# Patient Record
Sex: FEMALE | Race: WHITE | NOT HISPANIC OR LATINO | Employment: UNEMPLOYED | URBAN - NONMETROPOLITAN AREA
[De-identification: names, ages, dates, MRNs, and addresses within clinical notes are randomized per-mention and may not be internally consistent; named-entity substitution may affect disease eponyms.]

---

## 2017-09-11 ENCOUNTER — HOSPITAL ENCOUNTER (EMERGENCY)
Facility: HOSPITAL | Age: 27
Discharge: HOME | End: 2017-09-11
Attending: EMERGENCY MEDICINE | Admitting: EMERGENCY MEDICINE

## 2017-09-11 ENCOUNTER — APPOINTMENT (OUTPATIENT)
Dept: CT IMAGING | Facility: HOSPITAL | Age: 27
End: 2017-09-11

## 2017-09-11 VITALS
OXYGEN SATURATION: 100 % | RESPIRATION RATE: 18 BRPM | SYSTOLIC BLOOD PRESSURE: 122 MMHG | DIASTOLIC BLOOD PRESSURE: 68 MMHG | TEMPERATURE: 98.2 F | HEIGHT: 66 IN | WEIGHT: 148 LBS | HEART RATE: 78 BPM | BODY MASS INDEX: 23.78 KG/M2

## 2017-09-11 DIAGNOSIS — M46.1 SACROILIITIS (HCC): ICD-10-CM

## 2017-09-11 DIAGNOSIS — S16.1XXA CERVICAL STRAIN, ACUTE, INITIAL ENCOUNTER: Primary | ICD-10-CM
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
## 2017-09-11 LAB
6-ACETYL MORPHINE: NEGATIVE
ALBUMIN SERPL-MCNC: 4.3 G/DL (ref 3.5–5)
ALBUMIN/GLOB SERPL: 1.5 G/DL (ref 1.5–2.5)
ALP SERPL-CCNC: 81 U/L (ref 35–104)
ALT SERPL W P-5'-P-CCNC: 17 U/L (ref 10–36)
AMPHET+METHAMPHET UR QL: NEGATIVE
ANION GAP SERPL CALCULATED.3IONS-SCNC: 6.9 MMOL/L (ref 3.6–11.2)
ANISOCYTOSIS BLD QL: NORMAL
APTT PPP: 23.9 SECONDS (ref 23.8–36.1)
AST SERPL-CCNC: 29 U/L (ref 10–30)
B-HCG UR QL: NEGATIVE
BARBITURATES UR QL SCN: POSITIVE
BASOPHILS # BLD AUTO: 0.05 10*3/MM3 (ref 0–0.3)
BASOPHILS NFR BLD AUTO: 0.4 % (ref 0–2)
BENZODIAZ UR QL SCN: NEGATIVE
BILIRUB SERPL-MCNC: 0.3 MG/DL (ref 0.2–1.8)
BILIRUB UR QL STRIP: NEGATIVE
BUN BLD-MCNC: 9 MG/DL (ref 7–21)
BUN/CREAT SERPL: 15.8 (ref 7–25)
BUPRENORPHINE SERPL-MCNC: POSITIVE NG/ML
CALCIUM SPEC-SCNC: 9.3 MG/DL (ref 7.7–10)
CANNABINOIDS SERPL QL: POSITIVE
CHLORIDE SERPL-SCNC: 107 MMOL/L (ref 99–112)
CLARITY UR: CLEAR
CO2 SERPL-SCNC: 25.1 MMOL/L (ref 24.3–31.9)
COCAINE UR QL: NEGATIVE
COLOR UR: YELLOW
CREAT BLD-MCNC: 0.57 MG/DL (ref 0.43–1.29)
DEPRECATED RDW RBC AUTO: 50 FL (ref 37–54)
EOSINOPHIL # BLD AUTO: 0.13 10*3/MM3 (ref 0–0.7)
EOSINOPHIL NFR BLD AUTO: 1 % (ref 0–5)
ERYTHROCYTE [DISTWIDTH] IN BLOOD BY AUTOMATED COUNT: 19.9 % (ref 11.5–14.5)
GFR SERPL CREATININE-BSD FRML MDRD: 128 ML/MIN/1.73
GLOBULIN UR ELPH-MCNC: 2.9 GM/DL
GLUCOSE BLD-MCNC: 85 MG/DL (ref 70–110)
GLUCOSE UR STRIP-MCNC: NEGATIVE MG/DL
HCT VFR BLD AUTO: 41 % (ref 37–47)
HGB BLD-MCNC: 12.4 G/DL (ref 12–16)
HGB UR QL STRIP.AUTO: NEGATIVE
HYPOCHROMIA BLD QL: NORMAL
IMM GRANULOCYTES # BLD: 0.04 10*3/MM3 (ref 0–0.03)
IMM GRANULOCYTES NFR BLD: 0.3 % (ref 0–0.5)
INR PPP: 0.95 (ref 0.9–1.1)
KETONES UR QL STRIP: NEGATIVE
LEUKOCYTE ESTERASE UR QL STRIP.AUTO: NEGATIVE
LIPASE SERPL-CCNC: 35 U/L (ref 13–60)
LYMPHOCYTES # BLD AUTO: 2.57 10*3/MM3 (ref 1–3)
LYMPHOCYTES NFR BLD AUTO: 19.6 % (ref 21–51)
MCH RBC QN AUTO: 21.6 PG (ref 27–33)
MCHC RBC AUTO-ENTMCNC: 30.2 G/DL (ref 33–37)
MCV RBC AUTO: 71.6 FL (ref 80–94)
METHADONE UR QL SCN: NEGATIVE
MICROCYTES BLD QL: NORMAL
MONOCYTES # BLD AUTO: 0.92 10*3/MM3 (ref 0.1–0.9)
MONOCYTES NFR BLD AUTO: 7 % (ref 0–10)
NEUTROPHILS # BLD AUTO: 9.43 10*3/MM3 (ref 1.4–6.5)
NEUTROPHILS NFR BLD AUTO: 71.7 % (ref 30–70)
NITRITE UR QL STRIP: NEGATIVE
OPIATES UR QL: NEGATIVE
OSMOLALITY SERPL CALC.SUM OF ELEC: 275.5 MOSM/KG (ref 273–305)
OXYCODONE UR QL SCN: NEGATIVE
PCP UR QL SCN: NEGATIVE
PH UR STRIP.AUTO: 7 [PH] (ref 5–8)
PLAT MORPH BLD: NORMAL
PLATELET # BLD AUTO: 264 10*3/MM3 (ref 130–400)
PMV BLD AUTO: 10.9 FL (ref 6–10)
POTASSIUM BLD-SCNC: 4 MMOL/L (ref 3.5–5.3)
PROT SERPL-MCNC: 7.2 G/DL (ref 6–8)
PROT UR QL STRIP: NEGATIVE
PROTHROMBIN TIME: 12.8 SECONDS (ref 11–15.4)
RBC # BLD AUTO: 5.73 10*6/MM3 (ref 4.2–5.4)
SODIUM BLD-SCNC: 139 MMOL/L (ref 135–153)
SP GR UR STRIP: 1.01 (ref 1–1.03)
UROBILINOGEN UR QL STRIP: NORMAL
WBC NRBC COR # BLD: 13.14 10*3/MM3 (ref 4.5–12.5)

## 2017-09-11 PROCEDURE — 71250 CT THORAX DX C-: CPT | Performed by: RADIOLOGY

## 2017-09-11 PROCEDURE — 81025 URINE PREGNANCY TEST: CPT | Performed by: EMERGENCY MEDICINE

## 2017-09-11 PROCEDURE — 80307 DRUG TEST PRSMV CHEM ANLYZR: CPT | Performed by: EMERGENCY MEDICINE

## 2017-09-11 PROCEDURE — 72131 CT LUMBAR SPINE W/O DYE: CPT | Performed by: RADIOLOGY

## 2017-09-11 PROCEDURE — 25010000002 ORPHENADRINE CITRATE PER 60 MG: Performed by: EMERGENCY MEDICINE

## 2017-09-11 PROCEDURE — 74176 CT ABD & PELVIS W/O CONTRAST: CPT

## 2017-09-11 PROCEDURE — P9612 CATHETERIZE FOR URINE SPEC: HCPCS

## 2017-09-11 PROCEDURE — 25010000002 BUTORPHANOL PER 1 MG: Performed by: EMERGENCY MEDICINE

## 2017-09-11 PROCEDURE — 83690 ASSAY OF LIPASE: CPT | Performed by: EMERGENCY MEDICINE

## 2017-09-11 PROCEDURE — 85025 COMPLETE CBC W/AUTO DIFF WBC: CPT | Performed by: EMERGENCY MEDICINE

## 2017-09-11 PROCEDURE — 70450 CT HEAD/BRAIN W/O DYE: CPT | Performed by: RADIOLOGY

## 2017-09-11 PROCEDURE — 71250 CT THORAX DX C-: CPT

## 2017-09-11 PROCEDURE — 85610 PROTHROMBIN TIME: CPT | Performed by: EMERGENCY MEDICINE

## 2017-09-11 PROCEDURE — 74176 CT ABD & PELVIS W/O CONTRAST: CPT | Performed by: RADIOLOGY

## 2017-09-11 PROCEDURE — 81003 URINALYSIS AUTO W/O SCOPE: CPT | Performed by: EMERGENCY MEDICINE

## 2017-09-11 PROCEDURE — 36415 COLL VENOUS BLD VENIPUNCTURE: CPT

## 2017-09-11 PROCEDURE — 99284 EMERGENCY DEPT VISIT MOD MDM: CPT

## 2017-09-11 PROCEDURE — 72125 CT NECK SPINE W/O DYE: CPT | Performed by: RADIOLOGY

## 2017-09-11 PROCEDURE — 72131 CT LUMBAR SPINE W/O DYE: CPT

## 2017-09-11 PROCEDURE — 72125 CT NECK SPINE W/O DYE: CPT

## 2017-09-11 PROCEDURE — 96374 THER/PROPH/DIAG INJ IV PUSH: CPT

## 2017-09-11 PROCEDURE — 72128 CT CHEST SPINE W/O DYE: CPT

## 2017-09-11 PROCEDURE — 85007 BL SMEAR W/DIFF WBC COUNT: CPT | Performed by: EMERGENCY MEDICINE

## 2017-09-11 PROCEDURE — 80053 COMPREHEN METABOLIC PANEL: CPT | Performed by: EMERGENCY MEDICINE

## 2017-09-11 PROCEDURE — 70450 CT HEAD/BRAIN W/O DYE: CPT

## 2017-09-11 PROCEDURE — 85730 THROMBOPLASTIN TIME PARTIAL: CPT | Performed by: EMERGENCY MEDICINE

## 2017-09-11 PROCEDURE — 96375 TX/PRO/DX INJ NEW DRUG ADDON: CPT

## 2017-09-11 PROCEDURE — 72128 CT CHEST SPINE W/O DYE: CPT | Performed by: RADIOLOGY

## 2017-09-11 RX ORDER — BUPROPION HYDROCHLORIDE 100 MG/1
100 TABLET ORAL 2 TIMES DAILY
Status: ON HOLD | COMMUNITY
End: 2021-09-07

## 2017-09-11 RX ORDER — METAXALONE 800 MG/1
800 TABLET ORAL 3 TIMES DAILY PRN
Qty: 15 TABLET | Refills: 0 | Status: SHIPPED | OUTPATIENT
Start: 2017-09-11 | End: 2017-11-08

## 2017-09-11 RX ORDER — QUETIAPINE FUMARATE 25 MG/1
25 TABLET, FILM COATED ORAL NIGHTLY
Status: ON HOLD | COMMUNITY
End: 2021-09-07

## 2017-09-11 RX ORDER — HYDROXYZINE PAMOATE 25 MG/1
25 CAPSULE ORAL 3 TIMES DAILY PRN
Status: ON HOLD | COMMUNITY
End: 2021-09-07

## 2017-09-11 RX ORDER — SODIUM CHLORIDE 0.9 % (FLUSH) 0.9 %
10 SYRINGE (ML) INJECTION AS NEEDED
Status: DISCONTINUED | OUTPATIENT
Start: 2017-09-11 | End: 2017-09-12 | Stop reason: HOSPADM

## 2017-09-11 RX ORDER — NICOTINE 21 MG/24HR
1 PATCH, TRANSDERMAL 24 HOURS TRANSDERMAL ONCE
Status: DISCONTINUED | OUTPATIENT
Start: 2017-09-11 | End: 2017-09-12 | Stop reason: HOSPADM

## 2017-09-11 RX ORDER — NABUMETONE 750 MG/1
750 TABLET, FILM COATED ORAL 2 TIMES DAILY PRN
Qty: 20 TABLET | Refills: 0 | Status: SHIPPED | OUTPATIENT
Start: 2017-09-11 | End: 2017-11-08

## 2017-09-11 RX ORDER — NICOTINE 21 MG/24HR
PATCH, TRANSDERMAL 24 HOURS TRANSDERMAL
Status: DISCONTINUED
Start: 2017-09-11 | End: 2017-09-12 | Stop reason: HOSPADM

## 2017-09-11 RX ORDER — GABAPENTIN 600 MG/1
600 TABLET ORAL 2 TIMES DAILY
Status: ON HOLD | COMMUNITY
End: 2021-09-07

## 2017-09-11 RX ORDER — BUPRENORPHINE HYDROCHLORIDE AND NALOXONE HYDROCHLORIDE DIHYDRATE 8; 2 MG/1; MG/1
1 TABLET SUBLINGUAL DAILY
Status: ON HOLD | COMMUNITY
End: 2021-09-07

## 2017-09-11 RX ORDER — ORPHENADRINE CITRATE 30 MG/ML
60 INJECTION INTRAMUSCULAR; INTRAVENOUS ONCE
Status: COMPLETED | OUTPATIENT
Start: 2017-09-11 | End: 2017-09-11

## 2017-09-11 RX ADMIN — NICOTINE 1 PATCH: 21 PATCH TRANSDERMAL at 20:25

## 2017-09-11 RX ADMIN — BUTORPHANOL TARTRATE 1 MG: 2 INJECTION, SOLUTION INTRAMUSCULAR; INTRAVENOUS at 20:46

## 2017-09-11 RX ADMIN — ORPHENADRINE CITRATE 60 MG: 30 INJECTION INTRAMUSCULAR; INTRAVENOUS at 20:47

## 2017-09-11 RX ADMIN — Medication 1 PATCH: at 20:25

## 2017-09-12 NOTE — ED NOTES
I went back to the patient's room to get her Protime-INR and aPTT but she is still with radiology at this time.     Gene Sanchez  09/11/17 0822

## 2017-09-12 NOTE — ED NOTES
Consent for IV contrast signed by patient at this time; patient resting on stretcher with hard C Collar in place; will continue to monitor patient for any changes.     Amparo Tariq RN  09/11/17 2058

## 2017-09-12 NOTE — ED NOTES
Patient to Ct at this time via stretcher with c collar in place.      Amparo Tariq RN  09/11/17 1023
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
## 2017-09-12 NOTE — ED NOTES
Assisted Dr. Sandoval to log roll patient off of backboard. C-collar remains in place. Patient instructed to remain lying flat in her bed. Call light within reach.     Negra Mendoza RN  09/11/17 2011       Negra Mendoza RN  09/11/17 2012

## 2017-09-12 NOTE — ED NOTES
Call received from CT stating that IV in Left Upper Arm has infiltrated.     Amparo Tariq, LENCHO  09/11/17 2449

## 2017-09-12 NOTE — ED NOTES
Patient back from Ct at this time; Dr PARRY aware of inability to obtain iv access; reports to do ct's without contrast; xray made aware at this time.     Amparo Tariq RN  09/11/17 2140       Amparo Tariq RN  09/11/17 0182

## 2017-10-22 ENCOUNTER — HOSPITAL ENCOUNTER (EMERGENCY)
Facility: HOSPITAL | Age: 27
Discharge: HOME | End: 2017-10-22
Attending: EMERGENCY MEDICINE | Admitting: EMERGENCY MEDICINE

## 2017-10-22 VITALS
TEMPERATURE: 98.2 F | HEIGHT: 66 IN | SYSTOLIC BLOOD PRESSURE: 128 MMHG | WEIGHT: 140 LBS | HEART RATE: 93 BPM | OXYGEN SATURATION: 98 % | BODY MASS INDEX: 22.5 KG/M2 | RESPIRATION RATE: 17 BRPM | DIASTOLIC BLOOD PRESSURE: 72 MMHG

## 2017-10-22 DIAGNOSIS — K04.7 DENTAL ABSCESS: Primary | ICD-10-CM

## 2017-10-22 LAB
ALBUMIN SERPL-MCNC: 4.2 G/DL (ref 3.5–5)
ALBUMIN/GLOB SERPL: 1.2 G/DL (ref 1.5–2.5)
ALP SERPL-CCNC: 86 U/L (ref 35–104)
ALT SERPL W P-5'-P-CCNC: 19 U/L (ref 10–36)
ANION GAP SERPL CALCULATED.3IONS-SCNC: 2.5 MMOL/L (ref 3.6–11.2)
ANISOCYTOSIS BLD QL: NORMAL
AST SERPL-CCNC: 27 U/L (ref 10–30)
BASOPHILS # BLD AUTO: 0.04 10*3/MM3 (ref 0–0.3)
BASOPHILS NFR BLD AUTO: 0.5 % (ref 0–2)
BILIRUB SERPL-MCNC: 0.5 MG/DL (ref 0.2–1.8)
BUN BLD-MCNC: 7 MG/DL (ref 7–21)
BUN/CREAT SERPL: 14.3 (ref 7–25)
CALCIUM SPEC-SCNC: 9.2 MG/DL (ref 7.7–10)
CHLORIDE SERPL-SCNC: 105 MMOL/L (ref 99–112)
CO2 SERPL-SCNC: 31.5 MMOL/L (ref 24.3–31.9)
CREAT BLD-MCNC: 0.49 MG/DL (ref 0.43–1.29)
CRP SERPL-MCNC: 4.23 MG/DL (ref 0–0.99)
D-LACTATE SERPL-SCNC: 1 MMOL/L (ref 0.5–2)
DEPRECATED RDW RBC AUTO: 49.7 FL (ref 37–54)
EOSINOPHIL # BLD AUTO: 0.08 10*3/MM3 (ref 0–0.7)
EOSINOPHIL NFR BLD AUTO: 1.1 % (ref 0–5)
ERYTHROCYTE [DISTWIDTH] IN BLOOD BY AUTOMATED COUNT: 19.2 % (ref 11.5–14.5)
ERYTHROCYTE [SEDIMENTATION RATE] IN BLOOD: 21 MM/HR (ref 0–20)
GFR SERPL CREATININE-BSD FRML MDRD: >150 ML/MIN/1.73
GLOBULIN UR ELPH-MCNC: 3.4 GM/DL
GLUCOSE BLD-MCNC: 92 MG/DL (ref 70–110)
HCT VFR BLD AUTO: 37.8 % (ref 37–47)
HGB BLD-MCNC: 11.5 G/DL (ref 12–16)
HYPOCHROMIA BLD QL: NORMAL
IMM GRANULOCYTES # BLD: 0.02 10*3/MM3 (ref 0–0.03)
IMM GRANULOCYTES NFR BLD: 0.3 % (ref 0–0.5)
LARGE PLATELETS: NORMAL
LYMPHOCYTES # BLD AUTO: 2.16 10*3/MM3 (ref 1–3)
LYMPHOCYTES NFR BLD AUTO: 28.8 % (ref 21–51)
MCH RBC QN AUTO: 22.8 PG (ref 27–33)
MCHC RBC AUTO-ENTMCNC: 30.4 G/DL (ref 33–37)
MCV RBC AUTO: 74.9 FL (ref 80–94)
MICROCYTES BLD QL: NORMAL
MONOCYTES # BLD AUTO: 0.59 10*3/MM3 (ref 0.1–0.9)
MONOCYTES NFR BLD AUTO: 7.9 % (ref 0–10)
NEUTROPHILS # BLD AUTO: 4.62 10*3/MM3 (ref 1.4–6.5)
NEUTROPHILS NFR BLD AUTO: 61.4 % (ref 30–70)
OSMOLALITY SERPL CALC.SUM OF ELEC: 275.2 MOSM/KG (ref 273–305)
PLATELET # BLD AUTO: 326 10*3/MM3 (ref 130–400)
PMV BLD AUTO: 10.3 FL (ref 6–10)
POTASSIUM BLD-SCNC: 3.8 MMOL/L (ref 3.5–5.3)
PROT SERPL-MCNC: 7.6 G/DL (ref 6–8)
RBC # BLD AUTO: 5.05 10*6/MM3 (ref 4.2–5.4)
SODIUM BLD-SCNC: 139 MMOL/L (ref 135–153)
WBC NRBC COR # BLD: 7.51 10*3/MM3 (ref 4.5–12.5)

## 2017-10-22 PROCEDURE — 99283 EMERGENCY DEPT VISIT LOW MDM: CPT

## 2017-10-22 PROCEDURE — 83605 ASSAY OF LACTIC ACID: CPT | Performed by: NURSE PRACTITIONER

## 2017-10-22 PROCEDURE — 86140 C-REACTIVE PROTEIN: CPT | Performed by: NURSE PRACTITIONER

## 2017-10-22 PROCEDURE — 85007 BL SMEAR W/DIFF WBC COUNT: CPT | Performed by: NURSE PRACTITIONER

## 2017-10-22 PROCEDURE — 80053 COMPREHEN METABOLIC PANEL: CPT | Performed by: NURSE PRACTITIONER

## 2017-10-22 PROCEDURE — 85652 RBC SED RATE AUTOMATED: CPT | Performed by: NURSE PRACTITIONER

## 2017-10-22 PROCEDURE — 85025 COMPLETE CBC W/AUTO DIFF WBC: CPT | Performed by: NURSE PRACTITIONER

## 2017-10-22 PROCEDURE — 87040 BLOOD CULTURE FOR BACTERIA: CPT | Performed by: NURSE PRACTITIONER

## 2017-10-22 PROCEDURE — 96365 THER/PROPH/DIAG IV INF INIT: CPT

## 2017-10-22 RX ORDER — SODIUM CHLORIDE 0.9 % (FLUSH) 0.9 %
10 SYRINGE (ML) INJECTION AS NEEDED
Status: DISCONTINUED | OUTPATIENT
Start: 2017-10-22 | End: 2017-10-22 | Stop reason: HOSPADM

## 2017-10-22 RX ORDER — CLINDAMYCIN PHOSPHATE 600 MG/50ML
600 INJECTION INTRAVENOUS ONCE
Status: COMPLETED | OUTPATIENT
Start: 2017-10-22 | End: 2017-10-22

## 2017-10-22 RX ORDER — CLINDAMYCIN HYDROCHLORIDE 300 MG/1
300 CAPSULE ORAL EVERY 6 HOURS
Qty: 40 CAPSULE | Refills: 0 | Status: SHIPPED | OUTPATIENT
Start: 2017-10-22 | End: 2017-11-01

## 2017-10-22 RX ADMIN — CLINDAMYCIN PHOSPHATE 600 MG: 12 INJECTION, SOLUTION INTRAVENOUS at 17:21

## 2017-10-22 NOTE — ED PROVIDER NOTES
Subjective   Patient is a 27 y.o. female presenting with tooth pain.   History provided by:  Patient  Dental Pain   Location:  Upper and lower  Quality:  Aching, shooting and throbbing  Severity:  Moderate  Onset quality:  Gradual  Timing:  Constant  Chronicity:  New  Context: abscess    Relieved by:  None tried  Worsened by:  Nothing  Ineffective treatments:  None tried  Associated symptoms: facial pain and facial swelling    Associated symptoms: no fever        Review of Systems   Constitutional: Negative.  Negative for fever.   HENT: Positive for facial swelling.    Respiratory: Negative.    Cardiovascular: Negative.  Negative for chest pain.   Gastrointestinal: Negative.  Negative for abdominal pain.   Endocrine: Negative.    Genitourinary: Negative.  Negative for dysuria.   Skin: Negative.    Neurological: Negative.    Psychiatric/Behavioral: Negative.    All other systems reviewed and are negative.      History reviewed. No pertinent past medical history.    Allergies   Allergen Reactions   • Bactrim [Sulfamethoxazole-Trimethoprim]    • Erythromycin        Past Surgical History:   Procedure Laterality Date   •  SECTION     • CHOLECYSTECTOMY     • TONSILLECTOMY         History reviewed. No pertinent family history.    Social History     Social History   • Marital status: Single     Spouse name: N/A   • Number of children: N/A   • Years of education: N/A     Social History Main Topics   • Smoking status: Current Every Day Smoker     Packs/day: 1.00   • Smokeless tobacco: None   • Alcohol use Yes      Comment: occasional   • Drug use: No   • Sexual activity: Defer     Other Topics Concern   • None     Social History Narrative   • None           Objective   Physical Exam   Constitutional: She is oriented to person, place, and time. She appears well-developed and well-nourished. No distress.   HENT:   Head: Normocephalic and atraumatic.   Right Ear: External ear normal.   Left Ear: External ear normal.    Nose: Nose normal.   Mouth/Throat: Dental abscesses present.   Eyes: Conjunctivae and EOM are normal. Pupils are equal, round, and reactive to light.   Neck: Normal range of motion. Neck supple. No JVD present. No tracheal deviation present.   Cardiovascular: Normal rate, regular rhythm and normal heart sounds.    No murmur heard.  Pulmonary/Chest: Effort normal and breath sounds normal. No respiratory distress. She has no wheezes.   Abdominal: Soft. Bowel sounds are normal. There is no tenderness.   Musculoskeletal: Normal range of motion. She exhibits no edema or deformity.   Neurological: She is alert and oriented to person, place, and time. No cranial nerve deficit.   Skin: Skin is warm and dry. No rash noted. She is not diaphoretic. No erythema. No pallor.   Psychiatric: She has a normal mood and affect. Her behavior is normal. Thought content normal.   Nursing note and vitals reviewed.      Procedures         ED Course  ED Course                  MDM  Number of Diagnoses or Management Options  Dental abscess: new and does not require workup     Amount and/or Complexity of Data Reviewed  Clinical lab tests: reviewed    Risk of Complications, Morbidity, and/or Mortality  Presenting problems: low  Diagnostic procedures: minimal  Management options: minimal    Patient Progress  Patient progress: stable      Final diagnoses:   Dental abscess            Randa Ly, APRN  10/22/17 2128

## 2017-10-22 NOTE — ED NOTES
She was a difficult stick and lab was called to get blood culture.      Chapis Carbajal  10/22/17 6927

## 2017-10-27 LAB
BACTERIA SPEC AEROBE CULT: NORMAL
BACTERIA SPEC AEROBE CULT: NORMAL

## 2017-11-08 ENCOUNTER — OFFICE VISIT (OUTPATIENT)
Dept: PSYCHIATRY | Facility: CLINIC | Age: 27
End: 2017-11-08

## 2017-11-08 VITALS
DIASTOLIC BLOOD PRESSURE: 92 MMHG | SYSTOLIC BLOOD PRESSURE: 135 MMHG | WEIGHT: 147 LBS | HEIGHT: 66 IN | BODY MASS INDEX: 23.63 KG/M2 | HEART RATE: 107 BPM

## 2017-11-08 DIAGNOSIS — F33.1 MODERATE EPISODE OF RECURRENT MAJOR DEPRESSIVE DISORDER (HCC): Primary | ICD-10-CM

## 2017-11-08 DIAGNOSIS — F11.20 OPIOID DEPENDENCE ON AGONIST THERAPY (HCC): ICD-10-CM

## 2017-11-08 DIAGNOSIS — F40.01 PANIC DISORDER WITH AGORAPHOBIA, MODERATE AGORAPHOBIC AVOIDANCE AND SEVERE PANIC ATTACKS: ICD-10-CM

## 2017-11-08 PROCEDURE — 90791 PSYCH DIAGNOSTIC EVALUATION: CPT | Performed by: COUNSELOR

## 2017-11-08 RX ORDER — IBUPROFEN 600 MG/1
600 TABLET ORAL EVERY 6 HOURS PRN
COMMUNITY
End: 2019-11-18

## 2017-11-08 RX ORDER — CITALOPRAM 20 MG/1
20 TABLET ORAL DAILY
Status: ON HOLD | COMMUNITY
End: 2021-09-07
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
## 2017-11-09 NOTE — TREATMENT PLAN
Multi-Disciplinary Problems (from Behavioral Health Treatment Plan)    Active Problems     Problem: Anxiety (Priority: --)  (Start Date: 11/09/17) (Resolve Date: --)    Problem Details:  The patient self-scales this problem as a 10 with 10 being the worst.         Goal Start Date End Date    Patient will develop and implement behavioral and cognitive strategies to reduce anxiety and irrational fears. 11/09/17 --    Goal Details:  Progress toward goal:  Not appropriate to rate progress toward goal since this is the initial treatment plan.         Goal Intervention Frequency Start Date End Date    Help patient explore past emotional issues in relation to present anxiety. Weekly 11/09/17 11/09/18    Intervention Details:  Duration of treatment until until discharged.         Goal Intervention Frequency Start Date End Date    Help patient develop an awareness of their cognitive and physical responses to anxiety. PRN 11/09/17 11/09/18    Intervention Details:  Duration of treatment until until remission of symptoms.               Problem: Depression (Priority: --)  (Start Date: 11/09/17) (Resolve Date: --)    Problem Details:  The patient self-scales this problem as a 8 with 10 being the worst.         Goal Start Date End Date    Patient will demonstrate the ability to initiate new constructive life skills outside of sessions on a consistent basis. 11/09/17 --    Goal Details:  Progress toward goal:  Not appropriate to rate progress toward goal since this is the initial treatment plan.         Goal Intervention Frequency Start Date End Date    Assist patient in setting attainable activities of daily living goals. PRN 11/09/17 11/09/18    Goal Intervention Frequency Start Date End Date    Provide education about depression PRN 11/09/17 11/09/18    Intervention Details:  Duration of treatment until until discharged.         Goal Intervention Frequency Start Date End Date    Assist patient in developing healthy coping  "strategies. PRN 11/09/17 11/09/18    Intervention Details:  Duration of treatment until until discharged.               Problem: Relapse Events (Priority: --)  (Start Date: 11/09/17) (Resolve Date: --)    Problem Details:  The patient self-scales this problem as a 6 with 10 being the worst.         Goal Start Date End Date    Patient will demonstrate ability to address relapse triggers while maintaining \"clean\" behaviors. 11/09/17 --    Goal Details:  Progress toward goal:  Not appropriate to rate progress toward goal since this is the initial treatment plan.         Goal Intervention Frequency Start Date End Date    Assist patient in identifying specific triggers to relapse. PRN 11/09/17 11/09/18    Intervention Details:  Duration of treatment until until discharged.         Goal Intervention Frequency Start Date End Date    Assist patient in identifying and implementing ways to cope with each identified trigger. PRN 11/09/17 11/09/18    Intervention Details:  Duration of treatment until until discharged.                     Reviewed By     Deloris Miner [facility] 11/09/17 0851                 I have discussed and reviewed this treatment plan with the patient.  It has been printed for signatures.     "

## 2018-07-25 ENCOUNTER — HOSPITAL ENCOUNTER (EMERGENCY)
Facility: HOSPITAL | Age: 28
Discharge: HOME | End: 2018-07-25
Attending: EMERGENCY MEDICINE | Admitting: EMERGENCY MEDICINE

## 2018-07-25 VITALS
OXYGEN SATURATION: 100 % | HEART RATE: 112 BPM | TEMPERATURE: 98.2 F | RESPIRATION RATE: 20 BRPM | BODY MASS INDEX: 22.49 KG/M2 | SYSTOLIC BLOOD PRESSURE: 131 MMHG | WEIGHT: 135 LBS | DIASTOLIC BLOOD PRESSURE: 96 MMHG | HEIGHT: 65 IN

## 2018-07-25 DIAGNOSIS — K02.9 PAIN DUE TO DENTAL CARIES: Primary | ICD-10-CM

## 2018-07-25 PROCEDURE — 99283 EMERGENCY DEPT VISIT LOW MDM: CPT

## 2018-07-25 RX ORDER — CLINDAMYCIN HYDROCHLORIDE 300 MG/1
300 CAPSULE ORAL 4 TIMES DAILY
Qty: 40 CAPSULE | Refills: 0 | Status: SHIPPED | OUTPATIENT
Start: 2018-07-25 | End: 2018-08-04

## 2018-07-25 RX ADMIN — BENZOCAINE: 200 LIQUID DENTAL; ORAL; PERIODONTAL at 07:26

## 2019-11-18 ENCOUNTER — HOSPITAL ENCOUNTER (EMERGENCY)
Facility: HOSPITAL | Age: 29
Discharge: HOME | End: 2019-11-18
Attending: EMERGENCY MEDICINE | Admitting: EMERGENCY MEDICINE

## 2019-11-18 VITALS
DIASTOLIC BLOOD PRESSURE: 88 MMHG | OXYGEN SATURATION: 100 % | HEART RATE: 86 BPM | SYSTOLIC BLOOD PRESSURE: 136 MMHG | TEMPERATURE: 98.5 F | HEIGHT: 66 IN | BODY MASS INDEX: 23.3 KG/M2 | RESPIRATION RATE: 16 BRPM | WEIGHT: 145 LBS

## 2019-11-18 DIAGNOSIS — K02.9 DENTAL CARIES: Primary | ICD-10-CM

## 2019-11-18 PROCEDURE — 99283 EMERGENCY DEPT VISIT LOW MDM: CPT

## 2019-11-18 RX ORDER — PENICILLIN V POTASSIUM 500 MG/1
500 TABLET ORAL 4 TIMES DAILY
Qty: 40 TABLET | Refills: 0 | Status: ON HOLD | OUTPATIENT
Start: 2019-11-18 | End: 2021-09-07

## 2019-11-18 RX ADMIN — BENZOCAINE: 200 LIQUID DENTAL; ORAL; PERIODONTAL at 14:08

## 2019-11-18 NOTE — ED NOTES
Discharge instructions reviewed with patient, patient instructed to return to ED if symptoms worsen or if any new problems arise. Patient verbalizes understanding of discharge instructions, patient ambulatory out of ED with family. No acute distress noted.       Nelida Suarez RN  11/18/19 8751

## 2019-11-18 NOTE — ED PROVIDER NOTES
Subjective   29-year-old white female presents secondary to left jaw swelling.  Patient states this started approximately 3 days ago.  She does have a history of dental caries.  She is not followed by dentist.  She denies any fever.  No difficulty swallowing.  No periorbital pain or swelling.  No other complaints at this time.            Review of Systems   Constitutional: Negative.  Negative for fever.   HENT: Positive for facial swelling. Negative for trouble swallowing and voice change.    Respiratory: Negative.    Cardiovascular: Negative.  Negative for chest pain.   Gastrointestinal: Negative.  Negative for abdominal pain.   Endocrine: Negative.    Genitourinary: Negative.  Negative for dysuria.   Skin: Negative.    Neurological: Negative.    Psychiatric/Behavioral: Negative.    All other systems reviewed and are negative.      Past Medical History:   Diagnosis Date   • Anxiety    • Depression    • Substance abuse (CMS/[facility])        Allergies   Allergen Reactions   • Bactrim [Sulfamethoxazole-Trimethoprim]    • Erythromycin        Past Surgical History:   Procedure Laterality Date   •  SECTION     • CHOLECYSTECTOMY     • TONSILLECTOMY         Family History   Adopted: Yes       Social History     Socioeconomic History   • Marital status: Single     Spouse name: Not on file   • Number of children: Not on file   • Years of education: Not on file   • Highest education level: Not on file   Tobacco Use   • Smoking status: Current Every Day Smoker     Packs/day: 1.00   • Smokeless tobacco: Never Used   Substance and Sexual Activity   • Alcohol use: Yes     Comment: occasional   • Drug use: Yes     Types: Heroin     Comment: NO USE IN 1 YEAR   • Sexual activity: Defer           Objective   Physical Exam   Constitutional: She is oriented to person, place, and time. She appears well-developed and well-nourished. No distress.   HENT:   Head: Normocephalic and atraumatic.   Right Ear: External ear normal.   Left  Ear: External ear normal.   Nose: Nose normal.   Patient does have slight swelling to the left jawline.  There is no periorbital involvement.  No sign of Josafat's angina.  She does have dental caries with erosion.   Eyes: Conjunctivae and EOM are normal. Pupils are equal, round, and reactive to light.   Neck: Normal range of motion. Neck supple. No JVD present. No tracheal deviation present.   Cardiovascular: Normal rate, regular rhythm and normal heart sounds.   No murmur heard.  Pulmonary/Chest: Effort normal and breath sounds normal. No respiratory distress. She has no wheezes.   Abdominal: Soft. Bowel sounds are normal. There is no tenderness.   Musculoskeletal: Normal range of motion. She exhibits no edema or deformity.   Neurological: She is alert and oriented to person, place, and time. No cranial nerve deficit.   Skin: Skin is warm and dry. No rash noted. She is not diaphoretic. No erythema. No pallor.   She does have a small wart that she wanted to address.  She was counseled about salicylic acid treatment.   Psychiatric: She has a normal mood and affect. Her behavior is normal. Thought content normal.   Nursing note and vitals reviewed.      Procedures           ED Course                  MDM  Number of Diagnoses or Management Options  Dental caries: new and does not require workup  Risk of Complications, Morbidity, and/or Mortality  Presenting problems: low        Final diagnoses:   Dental caries              Kamari Hutchins PA  11/18/19 4605

## 2021-06-19 ENCOUNTER — HOSPITAL ENCOUNTER (EMERGENCY)
Facility: HOSPITAL | Age: 31
Discharge: HOME | End: 2021-06-19
Attending: EMERGENCY MEDICINE | Admitting: EMERGENCY MEDICINE

## 2021-06-19 VITALS
OXYGEN SATURATION: 98 % | HEART RATE: 85 BPM | RESPIRATION RATE: 18 BRPM | WEIGHT: 144 LBS | DIASTOLIC BLOOD PRESSURE: 69 MMHG | SYSTOLIC BLOOD PRESSURE: 108 MMHG | HEIGHT: 65 IN | BODY MASS INDEX: 23.99 KG/M2 | TEMPERATURE: 97.7 F

## 2021-06-19 DIAGNOSIS — F19.10 POLYSUBSTANCE ABUSE (HCC): ICD-10-CM

## 2021-06-19 DIAGNOSIS — B18.2 CHRONIC HEPATITIS C WITHOUT HEPATIC COMA (HCC): ICD-10-CM

## 2021-06-19 DIAGNOSIS — K02.9 DENTAL CARIES: ICD-10-CM

## 2021-06-19 DIAGNOSIS — R22.0 FACIAL SWELLING: Primary | ICD-10-CM

## 2021-06-19 LAB
6-ACETYL MORPHINE: NEGATIVE
ALBUMIN SERPL-MCNC: 4.25 G/DL (ref 3.5–5.2)
ALBUMIN/GLOB SERPL: 1.1 G/DL
ALP SERPL-CCNC: 85 U/L (ref 39–117)
ALT SERPL W P-5'-P-CCNC: 6 U/L (ref 1–33)
AMPHET+METHAMPHET UR QL: POSITIVE
AMYLASE SERPL-CCNC: 57 U/L (ref 28–100)
ANION GAP SERPL CALCULATED.3IONS-SCNC: 9.8 MMOL/L (ref 5–15)
AST SERPL-CCNC: 15 U/L (ref 1–32)
BACTERIA UR QL AUTO: ABNORMAL /HPF
BARBITURATES UR QL SCN: NEGATIVE
BASOPHILS # BLD AUTO: 0.04 10*3/MM3 (ref 0–0.2)
BASOPHILS NFR BLD AUTO: 0.5 % (ref 0–1.5)
BENZODIAZ UR QL SCN: POSITIVE
BILIRUB SERPL-MCNC: 0.5 MG/DL (ref 0–1.2)
BILIRUB UR QL STRIP: NEGATIVE
BUN SERPL-MCNC: 10 MG/DL (ref 6–20)
BUN/CREAT SERPL: 13.7 (ref 7–25)
BUPRENORPHINE SERPL-MCNC: POSITIVE NG/ML
C TRACH RRNA CVX QL NAA+PROBE: NOT DETECTED
CALCIUM SPEC-SCNC: 9.3 MG/DL (ref 8.6–10.5)
CANNABINOIDS SERPL QL: POSITIVE
CHLORIDE SERPL-SCNC: 104 MMOL/L (ref 98–107)
CLARITY UR: ABNORMAL
CO2 SERPL-SCNC: 26.2 MMOL/L (ref 22–29)
COCAINE UR QL: NEGATIVE
COLOR UR: ABNORMAL
CREAT SERPL-MCNC: 0.73 MG/DL (ref 0.57–1)
CRP SERPL-MCNC: <0.3 MG/DL (ref 0–0.5)
D-LACTATE SERPL-SCNC: 1.2 MMOL/L (ref 0.5–2)
DEPRECATED RDW RBC AUTO: 41.6 FL (ref 37–54)
EOSINOPHIL # BLD AUTO: 0.16 10*3/MM3 (ref 0–0.4)
EOSINOPHIL NFR BLD AUTO: 2.1 % (ref 0.3–6.2)
ERYTHROCYTE [DISTWIDTH] IN BLOOD BY AUTOMATED COUNT: 13.3 % (ref 12.3–15.4)
ETHANOL BLD-MCNC: <10 MG/DL (ref 0–10)
ETHANOL UR QL: <0.01 %
GFR SERPL CREATININE-BSD FRML MDRD: 94 ML/MIN/1.73
GLOBULIN UR ELPH-MCNC: 3.8 GM/DL
GLUCOSE SERPL-MCNC: 81 MG/DL (ref 65–99)
GLUCOSE UR STRIP-MCNC: NEGATIVE MG/DL
HAV IGM SERPL QL IA: ABNORMAL
HBV CORE IGM SERPL QL IA: ABNORMAL
HBV SURFACE AG SERPL QL IA: ABNORMAL
HCG SERPL QL: NEGATIVE
HCT VFR BLD AUTO: 47.6 % (ref 34–46.6)
HCV AB SER DONR QL: REACTIVE
HGB BLD-MCNC: 14.8 G/DL (ref 12–15.9)
HGB UR QL STRIP.AUTO: ABNORMAL
HIV1+2 AB SER QL: NORMAL
HOLD SPECIMEN: NORMAL
HYALINE CASTS UR QL AUTO: ABNORMAL /LPF
IMM GRANULOCYTES # BLD AUTO: 0.02 10*3/MM3 (ref 0–0.05)
IMM GRANULOCYTES NFR BLD AUTO: 0.3 % (ref 0–0.5)
KETONES UR QL STRIP: NEGATIVE
LEUKOCYTE ESTERASE UR QL STRIP.AUTO: NEGATIVE
LIPASE SERPL-CCNC: 21 U/L (ref 13–60)
LYMPHOCYTES # BLD AUTO: 2.08 10*3/MM3 (ref 0.7–3.1)
LYMPHOCYTES NFR BLD AUTO: 27.8 % (ref 19.6–45.3)
MAGNESIUM SERPL-MCNC: 1.9 MG/DL (ref 1.6–2.6)
MCH RBC QN AUTO: 26.8 PG (ref 26.6–33)
MCHC RBC AUTO-ENTMCNC: 31.1 G/DL (ref 31.5–35.7)
MCV RBC AUTO: 86.1 FL (ref 79–97)
METHADONE UR QL SCN: NEGATIVE
MONOCYTES # BLD AUTO: 0.4 10*3/MM3 (ref 0.1–0.9)
MONOCYTES NFR BLD AUTO: 5.3 % (ref 5–12)
N GONORRHOEA RRNA SPEC QL NAA+PROBE: NOT DETECTED
NEUTROPHILS NFR BLD AUTO: 4.78 10*3/MM3 (ref 1.7–7)
NEUTROPHILS NFR BLD AUTO: 64 % (ref 42.7–76)
NITRITE UR QL STRIP: NEGATIVE
NRBC BLD AUTO-RTO: 0 /100 WBC (ref 0–0.2)
OPIATES UR QL: NEGATIVE
OXYCODONE UR QL SCN: NEGATIVE
PCP UR QL SCN: NEGATIVE
PH UR STRIP.AUTO: 5.5 [PH] (ref 5–8)
PLATELET # BLD AUTO: 221 10*3/MM3 (ref 140–450)
PMV BLD AUTO: 10.8 FL (ref 6–12)
POTASSIUM SERPL-SCNC: 3.5 MMOL/L (ref 3.5–5.2)
PROT SERPL-MCNC: 8 G/DL (ref 6–8.5)
PROT UR QL STRIP: NEGATIVE
RBC # BLD AUTO: 5.53 10*6/MM3 (ref 3.77–5.28)
RBC # UR: ABNORMAL /HPF
REF LAB TEST METHOD: ABNORMAL
SODIUM SERPL-SCNC: 140 MMOL/L (ref 136–145)
SP GR UR STRIP: 1.03 (ref 1–1.03)
SQUAMOUS #/AREA URNS HPF: ABNORMAL /HPF
TRICHOMONAS VAGINALIS PCR: NOT DETECTED
UROBILINOGEN UR QL STRIP: ABNORMAL
WBC # BLD AUTO: 7.48 10*3/MM3 (ref 3.4–10.8)
WBC UR QL AUTO: ABNORMAL /HPF
WHOLE BLOOD HOLD SPECIMEN: NORMAL

## 2021-06-19 PROCEDURE — 80307 DRUG TEST PRSMV CHEM ANLYZR: CPT | Performed by: PHYSICIAN ASSISTANT

## 2021-06-19 PROCEDURE — 96375 TX/PRO/DX INJ NEW DRUG ADDON: CPT

## 2021-06-19 PROCEDURE — 80053 COMPREHEN METABOLIC PANEL: CPT | Performed by: PHYSICIAN ASSISTANT

## 2021-06-19 PROCEDURE — 81001 URINALYSIS AUTO W/SCOPE: CPT | Performed by: PHYSICIAN ASSISTANT

## 2021-06-19 PROCEDURE — 86140 C-REACTIVE PROTEIN: CPT | Performed by: PHYSICIAN ASSISTANT

## 2021-06-19 PROCEDURE — 87040 BLOOD CULTURE FOR BACTERIA: CPT | Performed by: PHYSICIAN ASSISTANT

## 2021-06-19 PROCEDURE — 87661 TRICHOMONAS VAGINALIS AMPLIF: CPT | Performed by: PHYSICIAN ASSISTANT

## 2021-06-19 PROCEDURE — 83735 ASSAY OF MAGNESIUM: CPT | Performed by: PHYSICIAN ASSISTANT

## 2021-06-19 PROCEDURE — 82150 ASSAY OF AMYLASE: CPT | Performed by: PHYSICIAN ASSISTANT

## 2021-06-19 PROCEDURE — 99284 EMERGENCY DEPT VISIT MOD MDM: CPT

## 2021-06-19 PROCEDURE — 83605 ASSAY OF LACTIC ACID: CPT | Performed by: PHYSICIAN ASSISTANT

## 2021-06-19 PROCEDURE — 85025 COMPLETE CBC W/AUTO DIFF WBC: CPT | Performed by: PHYSICIAN ASSISTANT

## 2021-06-19 PROCEDURE — 80074 ACUTE HEPATITIS PANEL: CPT | Performed by: PHYSICIAN ASSISTANT

## 2021-06-19 PROCEDURE — 83690 ASSAY OF LIPASE: CPT | Performed by: PHYSICIAN ASSISTANT

## 2021-06-19 PROCEDURE — 82077 ASSAY SPEC XCP UR&BREATH IA: CPT | Performed by: PHYSICIAN ASSISTANT

## 2021-06-19 PROCEDURE — 87491 CHLMYD TRACH DNA AMP PROBE: CPT | Performed by: PHYSICIAN ASSISTANT

## 2021-06-19 PROCEDURE — G0432 EIA HIV-1/HIV-2 SCREEN: HCPCS | Performed by: PHYSICIAN ASSISTANT

## 2021-06-19 PROCEDURE — 86592 SYPHILIS TEST NON-TREP QUAL: CPT | Performed by: PHYSICIAN ASSISTANT

## 2021-06-19 PROCEDURE — 87591 N.GONORRHOEAE DNA AMP PROB: CPT | Performed by: PHYSICIAN ASSISTANT

## 2021-06-19 PROCEDURE — 36415 COLL VENOUS BLD VENIPUNCTURE: CPT

## 2021-06-19 PROCEDURE — 25010000002 ONDANSETRON PER 1 MG: Performed by: PHYSICIAN ASSISTANT

## 2021-06-19 PROCEDURE — 84703 CHORIONIC GONADOTROPIN ASSAY: CPT | Performed by: PHYSICIAN ASSISTANT

## 2021-06-19 PROCEDURE — 96365 THER/PROPH/DIAG IV INF INIT: CPT

## 2021-06-19 RX ORDER — CLINDAMYCIN PHOSPHATE 600 MG/50ML
600 INJECTION INTRAVENOUS ONCE
Status: COMPLETED | OUTPATIENT
Start: 2021-06-19 | End: 2021-06-19

## 2021-06-19 RX ORDER — ONDANSETRON 2 MG/ML
4 INJECTION INTRAMUSCULAR; INTRAVENOUS ONCE
Status: COMPLETED | OUTPATIENT
Start: 2021-06-19 | End: 2021-06-19

## 2021-06-19 RX ORDER — CEFDINIR 300 MG/1
600 CAPSULE ORAL EVERY 12 HOURS SCHEDULED
Status: COMPLETED | OUTPATIENT
Start: 2021-06-19 | End: 2021-06-19

## 2021-06-19 RX ORDER — SODIUM CHLORIDE 0.9 % (FLUSH) 0.9 %
10 SYRINGE (ML) INJECTION AS NEEDED
Status: DISCONTINUED | OUTPATIENT
Start: 2021-06-19 | End: 2021-06-20 | Stop reason: HOSPADM

## 2021-06-19 RX ORDER — CEFDINIR 300 MG/1
300 CAPSULE ORAL 2 TIMES DAILY
Qty: 16 CAPSULE | Refills: 0 | Status: ON HOLD | OUTPATIENT
Start: 2021-06-19 | End: 2021-09-07

## 2021-06-19 RX ADMIN — ONDANSETRON 4 MG: 2 INJECTION INTRAMUSCULAR; INTRAVENOUS at 20:04

## 2021-06-19 RX ADMIN — SODIUM CHLORIDE 1000 ML: 9 INJECTION, SOLUTION INTRAVENOUS at 20:03

## 2021-06-19 RX ADMIN — CEFDINIR 600 MG: 300 CAPSULE ORAL at 22:01

## 2021-06-19 RX ADMIN — CLINDAMYCIN IN 5 PERCENT DEXTROSE 600 MG: 12 INJECTION, SOLUTION INTRAVENOUS at 20:42

## 2021-06-19 NOTE — ED PROVIDER NOTES
"Subjective   31-year-old female who presents to the ED today for right sided facial swelling.  She states that she started to have right-sided facial swelling yesterday.  She states she has been having issues with her teeth for a long time.  She states she does not currently see a dentist.  She states she has been having intermittent episodes of dental pain.  She states all of her teeth hurt.  She denies any fever.  She states she has been having nausea and vomiting.  States she has had some intermittent diarrhea.  She states she has had a history of hepatitis C for about 1 year.  She states more recently she has started to have \"pain in my liver.\"  She states that her liver hurts when she drinks alcohol.  She states about 2 minutes after she takes a shot of liquor she starts to have pain in the right upper quadrant.  She states she last drank alcohol last night.  She also reports that she has had difficulty urinating for a couple months.  She denies any dysuria but reports that she has increased urinary urgency and frequency and then she can only expel a small amount of urine.  She would like to also be checked for \"all STDs.\"  She states it has been a long time since she has been checked.  She states her last menstrual period was May 19.  She denies any abnormal vaginal discharge or abnormal vaginal bleeding.  She has had a prior cholecystectomy as well as a  and a tonsillectomy.  She states she also has a history of eczema.  She states she is not currently on any prescription medications.      History provided by:  Patient  Facial Swelling  Severity:  Moderate  Onset quality:  Gradual  Duration:  1 day  Timing:  Constant  Progression:  Worsening  Chronicity:  New  Associated symptoms: abdominal pain, diarrhea, nausea and vomiting    Associated symptoms: no chest pain, no congestion, no cough, no ear pain, no fatigue, no fever, no headaches, no myalgias, no rash, no rhinorrhea, no shortness of breath, no " sore throat and no wheezing        Review of Systems   Constitutional: Negative.  Negative for fatigue and fever.   HENT: Positive for dental problem and facial swelling. Negative for congestion, ear pain, rhinorrhea and sore throat.    Eyes: Negative.    Respiratory: Negative for cough, shortness of breath and wheezing.    Cardiovascular: Negative for chest pain.   Gastrointestinal: Positive for abdominal pain, diarrhea, nausea and vomiting.   Genitourinary: Positive for difficulty urinating, frequency and urgency. Negative for dysuria, flank pain, genital sores, hematuria, pelvic pain, vaginal bleeding, vaginal discharge and vaginal pain.   Musculoskeletal: Negative for back pain and myalgias.   Skin: Negative for rash.   Neurological: Negative for headaches.   Psychiatric/Behavioral: Negative.    All other systems reviewed and are negative.      Past Medical History:   Diagnosis Date   • Anxiety    • Depression    • Substance abuse (CMS/[facility])        Allergies   Allergen Reactions   • Bactrim [Sulfamethoxazole-Trimethoprim]    • Erythromycin        Past Surgical History:   Procedure Laterality Date   •  SECTION     • CHOLECYSTECTOMY     • TONSILLECTOMY         Family History   Adopted: Yes       Social History     Socioeconomic History   • Marital status: Single     Spouse name: Not on file   • Number of children: Not on file   • Years of education: Not on file   • Highest education level: Not on file   Tobacco Use   • Smoking status: Current Every Day Smoker     Packs/day: 1.00   • Smokeless tobacco: Never Used   Substance and Sexual Activity   • Alcohol use: Yes     Comment: occasional   • Drug use: Yes     Types: Heroin     Comment: NO USE IN 1 YEAR   • Sexual activity: Defer           Objective   Physical Exam  Vitals and nursing note reviewed.   Constitutional:       General: She is not in acute distress.     Appearance: Normal appearance. She is not diaphoretic.   HENT:      Head: Normocephalic and  atraumatic.      Right Ear: External ear normal.      Left Ear: External ear normal.      Nose: Nose normal.      Mouth/Throat:      Mouth: Mucous membranes are moist.      Comments: Right sided facial swelling noted, no lymphadenopathy.  Patient has very poor dentition diffusely with multiple dental caries.  Eyes:      Conjunctiva/sclera: Conjunctivae normal.      Pupils: Pupils are equal, round, and reactive to light.   Cardiovascular:      Rate and Rhythm: Regular rhythm. Tachycardia present.      Pulses: Normal pulses.      Heart sounds: Normal heart sounds.   Pulmonary:      Effort: Pulmonary effort is normal.      Breath sounds: Normal breath sounds.   Chest:      Chest wall: No tenderness.   Abdominal:      General: Bowel sounds are normal.      Palpations: Abdomen is soft.      Tenderness: There is no abdominal tenderness. There is no right CVA tenderness, left CVA tenderness, guarding or rebound.   Musculoskeletal:         General: Normal range of motion.      Cervical back: Normal range of motion and neck supple.   Lymphadenopathy:      Cervical: No cervical adenopathy.   Skin:     General: Skin is warm and dry.      Capillary Refill: Capillary refill takes less than 2 seconds.   Neurological:      General: No focal deficit present.      Mental Status: She is alert and oriented to person, place, and time.   Psychiatric:         Mood and Affect: Mood normal.         Procedures           ED Course  ED Course as of Jun 19 2154   Sat Jun 19, 2021 1933 Endorsed to Dr. Herrera    []      ED Course User Index  [] Madina Tolbert PA                                           [facility]    Final diagnoses:   Facial swelling   Chronic hepatitis C without hepatic coma (CMS/HCC)   Polysubstance abuse (CMS/HCC)   Dental caries   Electronically signed by GRACIA Johnson, 06/19/21, 7:34 PM EDT.    ED Disposition  ED Disposition     ED Disposition Condition Comment    Discharge Stable           No follow-up provider  specified.       Medication List      New Prescriptions    cefdinir 300 MG capsule  Commonly known as: OMNICEF  Take 1 capsule by mouth 2 (Two) Times a Day.           Where to Get Your Medications      These medications were sent to [id number] - [place], [place] - [address] [facility] - [phone]  - [phone]   [address] [address]    Phone: [phone]   · cefdinir 300 MG capsule          Black Herrera MD  06/19/21 2310

## 2021-06-20 NOTE — DISCHARGE INSTRUCTIONS
Call one of the offices below to establish a primary care provider.  If you are unable to get an appointment and feel it is an emergency and need to be seen immediately please return to the Emergency Department.    Call one of the office below to set up a primary care provider.    Dr. Av Baron                                                                                                       [address] [facility] [address]  [phone]    Dr. Estevez, Dr. ANH Queen, Dr. JAMES Queen ([facility])  [address] [facility] [address]  [phone]    Dr. Asher, Dr. Muir, Dr. Welsh ([facility])  [address] [facility] [address]  [phone]    Dr. Kearns  [address] [facility] [address]  [phone]    Dr. Lema, Dr. Barros, Dr. Ferreira, Dr. Sandy ([facility])  [address] [place] [address] [address]  [facility] [address]  [phone]    Dr. Abbie Villanueva  [address] [facility] [place] [address]  [phone]    Dr. Pushpa Fuentes  [address]   [address]  [facility] [address]  [phone]    Dr. Baron  [address] [facility] [address]  [phone]    Dr. Hermosillo, Dr. Ward  [address] [facility] [place] [address]  [phone]    Dr. Melendrez  [address]                                                              [address]  [facility] [address]  [phone]    Dr. Peace  [address] [facility] [address]  [phone]    Dr. Marielena Hameed  [address]  [address]  [place] [address]  [phone]    Dr. Hines and [facility]   [address] [facility]  [address]  [place] [address]  [phone]

## 2021-06-21 LAB — RPR SER QL: NORMAL

## 2021-06-24 LAB
BACTERIA SPEC AEROBE CULT: NORMAL
BACTERIA SPEC AEROBE CULT: NORMAL

## 2021-09-02 ENCOUNTER — HOSPITAL ENCOUNTER (EMERGENCY)
Facility: HOSPITAL | Age: 31
Discharge: HOME | End: 2021-09-02
Attending: STUDENT IN AN ORGANIZED HEALTH CARE EDUCATION/TRAINING PROGRAM | Admitting: STUDENT IN AN ORGANIZED HEALTH CARE EDUCATION/TRAINING PROGRAM

## 2021-09-02 VITALS
BODY MASS INDEX: 21.66 KG/M2 | RESPIRATION RATE: 18 BRPM | OXYGEN SATURATION: 99 % | HEART RATE: 80 BPM | HEIGHT: 65 IN | TEMPERATURE: 98 F | SYSTOLIC BLOOD PRESSURE: 152 MMHG | DIASTOLIC BLOOD PRESSURE: 98 MMHG | WEIGHT: 130 LBS

## 2021-09-02 DIAGNOSIS — R10.9 ABDOMINAL PAIN, UNSPECIFIED ABDOMINAL LOCATION: Primary | ICD-10-CM

## 2021-09-02 LAB
ALBUMIN SERPL-MCNC: 4.18 G/DL (ref 3.5–5.2)
ALBUMIN/GLOB SERPL: 1.2 G/DL
ALP SERPL-CCNC: 86 U/L (ref 39–117)
ALT SERPL W P-5'-P-CCNC: 10 U/L (ref 1–33)
ANION GAP SERPL CALCULATED.3IONS-SCNC: 11.9 MMOL/L (ref 5–15)
AST SERPL-CCNC: 16 U/L (ref 1–32)
B-HCG UR QL: NEGATIVE
BACTERIA UR QL AUTO: ABNORMAL /HPF
BASOPHILS # BLD AUTO: 0.05 10*3/MM3 (ref 0–0.2)
BASOPHILS NFR BLD AUTO: 0.6 % (ref 0–1.5)
BILIRUB SERPL-MCNC: 0.6 MG/DL (ref 0–1.2)
BILIRUB UR QL STRIP: NEGATIVE
BUN SERPL-MCNC: 7 MG/DL (ref 6–20)
BUN/CREAT SERPL: 13.5 (ref 7–25)
CALCIUM SPEC-SCNC: 9.4 MG/DL (ref 8.6–10.5)
CHLORIDE SERPL-SCNC: 103 MMOL/L (ref 98–107)
CLARITY UR: CLEAR
CO2 SERPL-SCNC: 22.1 MMOL/L (ref 22–29)
COLOR UR: YELLOW
CREAT SERPL-MCNC: 0.52 MG/DL (ref 0.57–1)
DEPRECATED RDW RBC AUTO: 41.5 FL (ref 37–54)
EOSINOPHIL # BLD AUTO: 0.22 10*3/MM3 (ref 0–0.4)
EOSINOPHIL NFR BLD AUTO: 2.6 % (ref 0.3–6.2)
ERYTHROCYTE [DISTWIDTH] IN BLOOD BY AUTOMATED COUNT: 13.8 % (ref 12.3–15.4)
GFR SERPL CREATININE-BSD FRML MDRD: 138 ML/MIN/1.73
GLOBULIN UR ELPH-MCNC: 3.5 GM/DL
GLUCOSE SERPL-MCNC: 107 MG/DL (ref 65–99)
GLUCOSE UR STRIP-MCNC: NEGATIVE MG/DL
HCT VFR BLD AUTO: 43.1 % (ref 34–46.6)
HGB BLD-MCNC: 13.9 G/DL (ref 12–15.9)
HGB UR QL STRIP.AUTO: ABNORMAL
HOLD SPECIMEN: NORMAL
HOLD SPECIMEN: NORMAL
HYALINE CASTS UR QL AUTO: ABNORMAL /LPF
IMM GRANULOCYTES # BLD AUTO: 0.01 10*3/MM3 (ref 0–0.05)
IMM GRANULOCYTES NFR BLD AUTO: 0.1 % (ref 0–0.5)
KETONES UR QL STRIP: NEGATIVE
LEUKOCYTE ESTERASE UR QL STRIP.AUTO: NEGATIVE
LIPASE SERPL-CCNC: 17 U/L (ref 13–60)
LYMPHOCYTES # BLD AUTO: 2.28 10*3/MM3 (ref 0.7–3.1)
LYMPHOCYTES NFR BLD AUTO: 27.4 % (ref 19.6–45.3)
MCH RBC QN AUTO: 26.7 PG (ref 26.6–33)
MCHC RBC AUTO-ENTMCNC: 32.3 G/DL (ref 31.5–35.7)
MCV RBC AUTO: 82.7 FL (ref 79–97)
MONOCYTES # BLD AUTO: 0.66 10*3/MM3 (ref 0.1–0.9)
MONOCYTES NFR BLD AUTO: 7.9 % (ref 5–12)
NEUTROPHILS NFR BLD AUTO: 5.09 10*3/MM3 (ref 1.7–7)
NEUTROPHILS NFR BLD AUTO: 61.4 % (ref 42.7–76)
NITRITE UR QL STRIP: NEGATIVE
NRBC BLD AUTO-RTO: 0 /100 WBC (ref 0–0.2)
PH UR STRIP.AUTO: 7 [PH] (ref 5–8)
PLATELET # BLD AUTO: 338 10*3/MM3 (ref 140–450)
PMV BLD AUTO: 10 FL (ref 6–12)
POTASSIUM SERPL-SCNC: 3.4 MMOL/L (ref 3.5–5.2)
PROT SERPL-MCNC: 7.7 G/DL (ref 6–8.5)
PROT UR QL STRIP: NEGATIVE
RBC # BLD AUTO: 5.21 10*6/MM3 (ref 3.77–5.28)
RBC # UR: ABNORMAL /HPF
REF LAB TEST METHOD: ABNORMAL
SODIUM SERPL-SCNC: 137 MMOL/L (ref 136–145)
SP GR UR STRIP: 1.01 (ref 1–1.03)
SQUAMOUS #/AREA URNS HPF: ABNORMAL /HPF
UROBILINOGEN UR QL STRIP: ABNORMAL
WBC # BLD AUTO: 8.31 10*3/MM3 (ref 3.4–10.8)
WBC UR QL AUTO: ABNORMAL /HPF
WHOLE BLOOD HOLD SPECIMEN: NORMAL
WHOLE BLOOD HOLD SPECIMEN: NORMAL

## 2021-09-02 PROCEDURE — 81025 URINE PREGNANCY TEST: CPT | Performed by: PHYSICIAN ASSISTANT

## 2021-09-02 PROCEDURE — 81001 URINALYSIS AUTO W/SCOPE: CPT | Performed by: PHYSICIAN ASSISTANT

## 2021-09-02 PROCEDURE — 80053 COMPREHEN METABOLIC PANEL: CPT | Performed by: PHYSICIAN ASSISTANT

## 2021-09-02 PROCEDURE — 85025 COMPLETE CBC W/AUTO DIFF WBC: CPT | Performed by: PHYSICIAN ASSISTANT

## 2021-09-02 PROCEDURE — 99283 EMERGENCY DEPT VISIT LOW MDM: CPT

## 2021-09-02 PROCEDURE — 83690 ASSAY OF LIPASE: CPT | Performed by: PHYSICIAN ASSISTANT

## 2021-09-02 RX ORDER — ONDANSETRON 4 MG/1
4 TABLET, ORALLY DISINTEGRATING ORAL EVERY 6 HOURS PRN
Qty: 12 TABLET | Refills: 0 | Status: SHIPPED | OUTPATIENT
Start: 2021-09-02

## 2021-09-02 RX ORDER — DICYCLOMINE HCL 20 MG
20 TABLET ORAL EVERY 6 HOURS PRN
Qty: 20 TABLET | Refills: 0 | Status: SHIPPED | OUTPATIENT
Start: 2021-09-02

## 2021-09-02 NOTE — ED NOTES
Pt sitting in ED waiting area, alert & oriented, NAD.  Patient reports no abd pain at this time, denies needs.  Waiting available room.     Pam Ortiz RN  09/02/21 2381

## 2021-09-02 NOTE — ED NOTES
MEDICAL SCREENING:    Reason for Visit: Abdominal pain      Patient initially seen in triage.  The patient was advised further evaluation and diagnostic testing will be needed, some of the treatment and testing will be initiated in the lobby in order to begin the process.  The patient will be returned to the waiting area for the time being and possibly be re-assessed by a subsequent ED provider.  The patient will be brought back to the treatment area in as timely manner as possible.       Jose Roberto Figueroa PA-C  09/02/21 0934

## 2021-09-02 NOTE — ED PROVIDER NOTES
Subjective   31-year-old female who presents to the emergency department chief complaint abdominal pain, cramping worsening over the last 6 to 7 months.  Patient has had nausea but no vomiting.  No reported fever, chills, body aches.  Patient states pain is primary located in her upper abdomen.  Patient does state she has had a cholecystectomy 11 years ago.      History provided by:  Patient   used: No    Abdominal Pain  Pain location:  Generalized  Pain quality: aching, gnawing and sharp    Pain radiates to:  LUQ  Pain severity:  Moderate  Onset quality:  Gradual  Duration:  4 weeks  Timing:  Intermittent  Progression:  Worsening  Chronicity:  New  Context: not alcohol use, not awakening from sleep, not eating, not laxative use, not previous surgeries, not recent illness, not recent sexual activity, not sick contacts, not suspicious food intake and not trauma    Relieved by:  Nothing  Worsened by:  Nothing  Ineffective treatments:  None tried  Associated symptoms: chills and nausea    Associated symptoms: no chest pain, no dysuria, no fever, no flatus, no hematemesis, no hematochezia, no hematuria, no shortness of breath, no sore throat, no vaginal bleeding and no vaginal discharge    Risk factors: no aspirin use, not elderly, has not had multiple surgeries, no NSAID use, not obese, not pregnant and no recent hospitalization        Review of Systems   Constitutional: Positive for chills. Negative for fever.   HENT: Negative for sore throat.    Eyes: Negative.  Negative for photophobia, pain, redness and itching.   Respiratory: Negative.  Negative for apnea, chest tightness and shortness of breath.    Cardiovascular: Negative.  Negative for chest pain.   Gastrointestinal: Positive for abdominal distention, abdominal pain and nausea. Negative for flatus, hematemesis and hematochezia.   Genitourinary: Positive for pelvic pain. Negative for dysuria, flank pain, frequency, genital sores, hematuria,  menstrual problem, vaginal bleeding and vaginal discharge.   Musculoskeletal: Negative.  Negative for arthralgias, back pain, gait problem, joint swelling, myalgias and neck pain.   Skin: Negative.  Negative for color change, pallor and rash.   Hematological: Negative.    Psychiatric/Behavioral: Negative.  Negative for confusion, dysphoric mood and hallucinations. The patient is not hyperactive.    All other systems reviewed and are negative.      Past Medical History:   Diagnosis Date   • Anxiety    • Depression    • Substance abuse (CMS/[facility])        Allergies   Allergen Reactions   • Bactrim [Sulfamethoxazole-Trimethoprim]    • Erythromycin        Past Surgical History:   Procedure Laterality Date   •  SECTION     • CHOLECYSTECTOMY     • TONSILLECTOMY         Family History   Adopted: Yes       Social History     Socioeconomic History   • Marital status: Single     Spouse name: Not on file   • Number of children: Not on file   • Years of education: Not on file   • Highest education level: Not on file   Tobacco Use   • Smoking status: Current Every Day Smoker     Packs/day: 1.00   • Smokeless tobacco: Never Used   Substance and Sexual Activity   • Alcohol use: Yes     Comment: occasional   • Drug use: Yes     Types: Heroin     Comment: NO USE IN 1 YEAR   • Sexual activity: Defer           Objective   Physical Exam  Vitals and nursing note reviewed.   Constitutional:       General: She is not in acute distress.     Appearance: She is well-developed and normal weight. She is not ill-appearing or toxic-appearing.   HENT:      Head: Normocephalic and atraumatic.      Mouth/Throat:      Mouth: Mucous membranes are moist.      Pharynx: Oropharynx is clear. No pharyngeal swelling or oropharyngeal exudate.   Eyes:      General: No scleral icterus.     Extraocular Movements: Extraocular movements intact.   Cardiovascular:      Rate and Rhythm: Normal rate and regular rhythm.      Heart sounds: Normal heart sounds. No  murmur heard.   No friction rub. No gallop.    Pulmonary:      Effort: Pulmonary effort is normal. No respiratory distress.      Breath sounds: Normal breath sounds. No stridor. No wheezing, rhonchi or rales.   Chest:      Chest wall: No tenderness.   Abdominal:      General: Abdomen is flat and protuberant. Bowel sounds are normal. There is no distension or abdominal bruit. There are no signs of injury.      Palpations: Abdomen is soft. There is no shifting dullness, fluid wave, hepatomegaly, splenomegaly or mass.      Tenderness: There is abdominal tenderness in the right upper quadrant and epigastric area. There is guarding and rebound.      Hernia: No hernia is present. There is no hernia in the umbilical area, ventral area, left inguinal area or right femoral area.   Skin:     General: Skin is warm and dry.      Capillary Refill: Capillary refill takes less than 2 seconds.      Coloration: Skin is not cyanotic, jaundiced, mottled or pale.      Findings: No erythema or rash.   Neurological:      General: No focal deficit present.      Mental Status: She is alert and oriented to person, place, and time.      Cranial Nerves: No cranial nerve deficit.      Motor: No weakness.   Psychiatric:         Mood and Affect: Mood is anxious.         Behavior: Behavior normal.         Procedures           ED Course                                           MDM    Final diagnoses:   Abdominal pain, unspecified abdominal location       ED Disposition  ED Disposition     ED Disposition Condition Comment    Discharge Stable           [place] [id number] [phone]  Call in 1 day      Garo Gaitan MD  [address] [facility] [id number] [phone]    Call in 1 day           Medication List      New Prescriptions    dicyclomine 20 MG tablet  Commonly known as: BENTYL  Take 1 tablet by mouth Every 6 (Six) Hours As Needed (abdominal pain).     ondansetron ODT 4 MG disintegrating  tablet  Commonly known as: ZOFRAN-ODT  Place 1 tablet on the tongue Every 6 (Six) Hours As Needed for Vomiting (abdominal pain).           Where to Get Your Medications      These medications were sent to [facility] - [place], [place] - [address] [place] [address] - [phone]  - [phone] [address] [address]    Phone: [phone]   · dicyclomine 20 MG tablet  · ondansetron ODT 4 MG disintegrating tablet          Jose Roberto Figueroa PA-C  09/02/21 1326

## 2021-09-06 ENCOUNTER — HOSPITAL ENCOUNTER (INPATIENT)
Facility: HOSPITAL | Age: 31
LOS: 2 days | Discharge: HOME | End: 2021-09-08
Attending: PSYCHIATRY & NEUROLOGY | Admitting: PSYCHIATRY & NEUROLOGY

## 2021-09-06 ENCOUNTER — HOSPITAL ENCOUNTER (EMERGENCY)
Facility: HOSPITAL | Age: 31
Discharge: STILL ADMITTED OR ADMINISTRATIVE | End: 2021-09-06
Attending: STUDENT IN AN ORGANIZED HEALTH CARE EDUCATION/TRAINING PROGRAM

## 2021-09-06 VITALS
WEIGHT: 140 LBS | HEART RATE: 51 BPM | OXYGEN SATURATION: 99 % | TEMPERATURE: 97.8 F | DIASTOLIC BLOOD PRESSURE: 83 MMHG | RESPIRATION RATE: 20 BRPM | HEIGHT: 65 IN | BODY MASS INDEX: 23.32 KG/M2 | SYSTOLIC BLOOD PRESSURE: 120 MMHG

## 2021-09-06 DIAGNOSIS — R68.89 WITHDRAWAL COMPLAINT: ICD-10-CM

## 2021-09-06 DIAGNOSIS — Z78.9 ADMITTED TO SUBSTANCE MISUSE DETOXIFICATION CENTER: Primary | ICD-10-CM

## 2021-09-06 PROBLEM — F11.20 OPIATE DEPENDENCE (HCC): Status: ACTIVE | Noted: 2021-09-06

## 2021-09-06 LAB
ALBUMIN SERPL-MCNC: 4.45 G/DL (ref 3.5–5.2)
ALBUMIN/GLOB SERPL: 1.3 G/DL
ALP SERPL-CCNC: 82 U/L (ref 39–117)
ALT SERPL W P-5'-P-CCNC: 10 U/L (ref 1–33)
AMPHET+METHAMPHET UR QL: POSITIVE
AMPHETAMINES UR QL: POSITIVE
ANION GAP SERPL CALCULATED.3IONS-SCNC: 15.5 MMOL/L (ref 5–15)
AST SERPL-CCNC: 18 U/L (ref 1–32)
BACTERIA UR QL AUTO: ABNORMAL /HPF
BARBITURATES UR QL SCN: NEGATIVE
BASOPHILS # BLD AUTO: 0.08 10*3/MM3 (ref 0–0.2)
BASOPHILS NFR BLD AUTO: 0.7 % (ref 0–1.5)
BENZODIAZ UR QL SCN: NEGATIVE
BILIRUB SERPL-MCNC: 0.8 MG/DL (ref 0–1.2)
BILIRUB UR QL STRIP: NEGATIVE
BUN SERPL-MCNC: 7 MG/DL (ref 6–20)
BUN/CREAT SERPL: 13 (ref 7–25)
BUPRENORPHINE SERPL-MCNC: POSITIVE NG/ML
CALCIUM SPEC-SCNC: 9.7 MG/DL (ref 8.6–10.5)
CANNABINOIDS SERPL QL: POSITIVE
CHLORIDE SERPL-SCNC: 104 MMOL/L (ref 98–107)
CLARITY UR: ABNORMAL
CO2 SERPL-SCNC: 18.5 MMOL/L (ref 22–29)
COCAINE UR QL: NEGATIVE
COLOR UR: YELLOW
CREAT SERPL-MCNC: 0.54 MG/DL (ref 0.57–1)
DEPRECATED RDW RBC AUTO: 43.3 FL (ref 37–54)
EOSINOPHIL # BLD AUTO: 0.12 10*3/MM3 (ref 0–0.4)
EOSINOPHIL NFR BLD AUTO: 1.1 % (ref 0.3–6.2)
ERYTHROCYTE [DISTWIDTH] IN BLOOD BY AUTOMATED COUNT: 14.2 % (ref 12.3–15.4)
ETHANOL BLD-MCNC: <10 MG/DL (ref 0–10)
ETHANOL UR QL: <0.01 %
FLUAV SUBTYP SPEC NAA+PROBE: NOT DETECTED
FLUBV RNA ISLT QL NAA+PROBE: NOT DETECTED
GFR SERPL CREATININE-BSD FRML MDRD: 133 ML/MIN/1.73
GLOBULIN UR ELPH-MCNC: 3.6 GM/DL
GLUCOSE SERPL-MCNC: 99 MG/DL (ref 65–99)
GLUCOSE UR STRIP-MCNC: NEGATIVE MG/DL
HCG SERPL QL: NEGATIVE
HCT VFR BLD AUTO: 44.9 % (ref 34–46.6)
HGB BLD-MCNC: 14.2 G/DL (ref 12–15.9)
HGB UR QL STRIP.AUTO: ABNORMAL
HYALINE CASTS UR QL AUTO: ABNORMAL /LPF
IMM GRANULOCYTES # BLD AUTO: 0.03 10*3/MM3 (ref 0–0.05)
IMM GRANULOCYTES NFR BLD AUTO: 0.3 % (ref 0–0.5)
KETONES UR QL STRIP: ABNORMAL
LEUKOCYTE ESTERASE UR QL STRIP.AUTO: ABNORMAL
LYMPHOCYTES # BLD AUTO: 1.85 10*3/MM3 (ref 0.7–3.1)
LYMPHOCYTES NFR BLD AUTO: 17.1 % (ref 19.6–45.3)
MAGNESIUM SERPL-MCNC: 2 MG/DL (ref 1.6–2.6)
MCH RBC QN AUTO: 26.5 PG (ref 26.6–33)
MCHC RBC AUTO-ENTMCNC: 31.6 G/DL (ref 31.5–35.7)
MCV RBC AUTO: 83.8 FL (ref 79–97)
METHADONE UR QL SCN: NEGATIVE
MONOCYTES # BLD AUTO: 0.5 10*3/MM3 (ref 0.1–0.9)
MONOCYTES NFR BLD AUTO: 4.6 % (ref 5–12)
NEUTROPHILS NFR BLD AUTO: 76.2 % (ref 42.7–76)
NEUTROPHILS NFR BLD AUTO: 8.27 10*3/MM3 (ref 1.7–7)
NITRITE UR QL STRIP: NEGATIVE
NRBC BLD AUTO-RTO: 0 /100 WBC (ref 0–0.2)
OPIATES UR QL: NEGATIVE
OXYCODONE UR QL SCN: NEGATIVE
PCP UR QL SCN: NEGATIVE
PH UR STRIP.AUTO: 6.5 [PH] (ref 5–8)
PLATELET # BLD AUTO: 351 10*3/MM3 (ref 140–450)
PMV BLD AUTO: 9.7 FL (ref 6–12)
POTASSIUM SERPL-SCNC: 4.3 MMOL/L (ref 3.5–5.2)
PROPOXYPH UR QL: NEGATIVE
PROT SERPL-MCNC: 8 G/DL (ref 6–8.5)
PROT UR QL STRIP: NEGATIVE
RBC # BLD AUTO: 5.36 10*6/MM3 (ref 3.77–5.28)
RBC # UR: ABNORMAL /HPF
REF LAB TEST METHOD: ABNORMAL
SARS-COV-2 RNA PNL SPEC NAA+PROBE: NOT DETECTED
SODIUM SERPL-SCNC: 138 MMOL/L (ref 136–145)
SP GR UR STRIP: 1.02 (ref 1–1.03)
SQUAMOUS #/AREA URNS HPF: ABNORMAL /HPF
TRICYCLICS UR QL SCN: NEGATIVE
UROBILINOGEN UR QL STRIP: ABNORMAL
WBC # BLD AUTO: 10.85 10*3/MM3 (ref 3.4–10.8)
WBC UR QL AUTO: ABNORMAL /HPF

## 2021-09-06 PROCEDURE — 99285 EMERGENCY DEPT VISIT HI MDM: CPT

## 2021-09-06 PROCEDURE — 80053 COMPREHEN METABOLIC PANEL: CPT | Performed by: STUDENT IN AN ORGANIZED HEALTH CARE EDUCATION/TRAINING PROGRAM

## 2021-09-06 PROCEDURE — 87636 SARSCOV2 & INF A&B AMP PRB: CPT | Performed by: STUDENT IN AN ORGANIZED HEALTH CARE EDUCATION/TRAINING PROGRAM

## 2021-09-06 PROCEDURE — HZ2ZZZZ DETOXIFICATION SERVICES FOR SUBSTANCE ABUSE TREATMENT: ICD-10-PCS | Performed by: PSYCHIATRY & NEUROLOGY

## 2021-09-06 PROCEDURE — 82077 ASSAY SPEC XCP UR&BREATH IA: CPT | Performed by: STUDENT IN AN ORGANIZED HEALTH CARE EDUCATION/TRAINING PROGRAM

## 2021-09-06 PROCEDURE — 36415 COLL VENOUS BLD VENIPUNCTURE: CPT

## 2021-09-06 PROCEDURE — 80306 DRUG TEST PRSMV INSTRMNT: CPT | Performed by: STUDENT IN AN ORGANIZED HEALTH CARE EDUCATION/TRAINING PROGRAM

## 2021-09-06 PROCEDURE — 93005 ELECTROCARDIOGRAM TRACING: CPT | Performed by: PSYCHIATRY & NEUROLOGY

## 2021-09-06 PROCEDURE — 81001 URINALYSIS AUTO W/SCOPE: CPT | Performed by: STUDENT IN AN ORGANIZED HEALTH CARE EDUCATION/TRAINING PROGRAM

## 2021-09-06 PROCEDURE — 83735 ASSAY OF MAGNESIUM: CPT | Performed by: STUDENT IN AN ORGANIZED HEALTH CARE EDUCATION/TRAINING PROGRAM

## 2021-09-06 PROCEDURE — 85025 COMPLETE CBC W/AUTO DIFF WBC: CPT | Performed by: STUDENT IN AN ORGANIZED HEALTH CARE EDUCATION/TRAINING PROGRAM

## 2021-09-06 PROCEDURE — 84703 CHORIONIC GONADOTROPIN ASSAY: CPT | Performed by: STUDENT IN AN ORGANIZED HEALTH CARE EDUCATION/TRAINING PROGRAM

## 2021-09-06 RX ORDER — BENZTROPINE MESYLATE 1 MG/1
2 TABLET ORAL ONCE AS NEEDED
Status: DISCONTINUED | OUTPATIENT
Start: 2021-09-06 | End: 2021-09-08 | Stop reason: HOSPADM

## 2021-09-06 RX ORDER — ONDANSETRON 4 MG/1
4 TABLET, FILM COATED ORAL EVERY 6 HOURS PRN
Status: DISCONTINUED | OUTPATIENT
Start: 2021-09-06 | End: 2021-09-08 | Stop reason: HOSPADM

## 2021-09-06 RX ORDER — DICYCLOMINE HYDROCHLORIDE 10 MG/1
10 CAPSULE ORAL 3 TIMES DAILY PRN
Status: DISCONTINUED | OUTPATIENT
Start: 2021-09-06 | End: 2021-09-07 | Stop reason: SDUPTHER

## 2021-09-06 RX ORDER — CLONIDINE HYDROCHLORIDE 0.1 MG/1
0.1 TABLET ORAL 4 TIMES DAILY PRN
Status: ACTIVE | OUTPATIENT
Start: 2021-09-06 | End: 2021-09-07

## 2021-09-06 RX ORDER — CLONIDINE HYDROCHLORIDE 0.1 MG/1
0.1 TABLET ORAL DAILY PRN
Status: DISCONTINUED | OUTPATIENT
Start: 2021-09-10 | End: 2021-09-08 | Stop reason: HOSPADM

## 2021-09-06 RX ORDER — FAMOTIDINE 20 MG/1
20 TABLET, FILM COATED ORAL 2 TIMES DAILY PRN
Status: DISCONTINUED | OUTPATIENT
Start: 2021-09-06 | End: 2021-09-08 | Stop reason: HOSPADM

## 2021-09-06 RX ORDER — TRAZODONE HYDROCHLORIDE 50 MG/1
50 TABLET ORAL NIGHTLY PRN
Status: DISCONTINUED | OUTPATIENT
Start: 2021-09-06 | End: 2021-09-08 | Stop reason: HOSPADM

## 2021-09-06 RX ORDER — ACETAMINOPHEN 325 MG/1
650 TABLET ORAL EVERY 6 HOURS PRN
Status: DISCONTINUED | OUTPATIENT
Start: 2021-09-06 | End: 2021-09-08 | Stop reason: HOSPADM

## 2021-09-06 RX ORDER — ECHINACEA PURPUREA EXTRACT 125 MG
2 TABLET ORAL AS NEEDED
Status: DISCONTINUED | OUTPATIENT
Start: 2021-09-06 | End: 2021-09-08 | Stop reason: HOSPADM

## 2021-09-06 RX ORDER — IBUPROFEN 400 MG/1
400 TABLET ORAL EVERY 6 HOURS PRN
Status: DISCONTINUED | OUTPATIENT
Start: 2021-09-06 | End: 2021-09-08 | Stop reason: HOSPADM

## 2021-09-06 RX ORDER — CYCLOBENZAPRINE HCL 10 MG
10 TABLET ORAL 3 TIMES DAILY PRN
Status: DISCONTINUED | OUTPATIENT
Start: 2021-09-06 | End: 2021-09-08 | Stop reason: HOSPADM

## 2021-09-06 RX ORDER — HYDROXYZINE 50 MG/1
50 TABLET, FILM COATED ORAL EVERY 6 HOURS PRN
Status: DISCONTINUED | OUTPATIENT
Start: 2021-09-06 | End: 2021-09-08 | Stop reason: HOSPADM

## 2021-09-06 RX ORDER — CLONIDINE HYDROCHLORIDE 0.1 MG/1
0.1 TABLET ORAL 4 TIMES DAILY PRN
Status: ACTIVE | OUTPATIENT
Start: 2021-09-07 | End: 2021-09-08

## 2021-09-06 RX ORDER — LOPERAMIDE HYDROCHLORIDE 2 MG/1
2 CAPSULE ORAL
Status: DISCONTINUED | OUTPATIENT
Start: 2021-09-06 | End: 2021-09-08 | Stop reason: HOSPADM

## 2021-09-06 RX ORDER — CLONIDINE HYDROCHLORIDE 0.1 MG/1
0.1 TABLET ORAL 2 TIMES DAILY PRN
Status: DISCONTINUED | OUTPATIENT
Start: 2021-09-09 | End: 2021-09-08 | Stop reason: HOSPADM

## 2021-09-06 RX ORDER — BENZTROPINE MESYLATE 1 MG/ML
1 INJECTION INTRAMUSCULAR; INTRAVENOUS ONCE AS NEEDED
Status: DISCONTINUED | OUTPATIENT
Start: 2021-09-06 | End: 2021-09-08 | Stop reason: HOSPADM

## 2021-09-06 RX ORDER — CLONIDINE HYDROCHLORIDE 0.1 MG/1
0.1 TABLET ORAL 3 TIMES DAILY PRN
Status: DISCONTINUED | OUTPATIENT
Start: 2021-09-08 | End: 2021-09-08 | Stop reason: HOSPADM

## 2021-09-06 RX ORDER — ALUMINA, MAGNESIA, AND SIMETHICONE 2400; 2400; 240 MG/30ML; MG/30ML; MG/30ML
15 SUSPENSION ORAL EVERY 6 HOURS PRN
Status: DISCONTINUED | OUTPATIENT
Start: 2021-09-06 | End: 2021-09-08 | Stop reason: HOSPADM

## 2021-09-06 RX ORDER — BENZONATATE 100 MG/1
100 CAPSULE ORAL 3 TIMES DAILY PRN
Status: DISCONTINUED | OUTPATIENT
Start: 2021-09-06 | End: 2021-09-08 | Stop reason: HOSPADM

## 2021-09-07 PROBLEM — F17.200 NICOTINE USE DISORDER: Status: ACTIVE | Noted: 2021-09-07

## 2021-09-07 PROBLEM — F12.20 TETRAHYDROCANNABINOL (THC) USE DISORDER, SEVERE, DEPENDENCE (HCC): Status: ACTIVE | Noted: 2021-09-07

## 2021-09-07 PROBLEM — F15.20 METHAMPHETAMINE USE DISORDER, SEVERE (HCC): Status: ACTIVE | Noted: 2021-09-07

## 2021-09-07 LAB
QT INTERVAL: 470 MS
QTC INTERVAL: 437 MS

## 2021-09-07 PROCEDURE — 63710000001 ONDANSETRON PER 8 MG: Performed by: PSYCHIATRY & NEUROLOGY

## 2021-09-07 PROCEDURE — 99223 1ST HOSP IP/OBS HIGH 75: CPT | Performed by: PSYCHIATRY & NEUROLOGY

## 2021-09-07 RX ORDER — DICYCLOMINE HCL 20 MG
20 TABLET ORAL EVERY 6 HOURS PRN
Status: DISCONTINUED | OUTPATIENT
Start: 2021-09-07 | End: 2021-09-08 | Stop reason: HOSPADM

## 2021-09-07 RX ORDER — ONDANSETRON 4 MG/1
4 TABLET, ORALLY DISINTEGRATING ORAL EVERY 6 HOURS PRN
Status: CANCELLED | OUTPATIENT
Start: 2021-09-07

## 2021-09-07 RX ADMIN — IBUPROFEN 400 MG: 400 TABLET, FILM COATED ORAL at 08:35

## 2021-09-07 RX ADMIN — ONDANSETRON HYDROCHLORIDE 4 MG: 4 TABLET, FILM COATED ORAL at 21:17

## 2021-09-07 RX ADMIN — HYDROXYZINE HYDROCHLORIDE 50 MG: 50 TABLET ORAL at 08:35

## 2021-09-07 RX ADMIN — TRAZODONE HYDROCHLORIDE 50 MG: 50 TABLET ORAL at 21:17

## 2021-09-07 RX ADMIN — CYCLOBENZAPRINE 10 MG: 10 TABLET, FILM COATED ORAL at 08:35

## 2021-09-07 RX ADMIN — DICYCLOMINE HYDROCHLORIDE 10 MG: 10 CAPSULE ORAL at 08:35

## 2021-09-07 RX ADMIN — ONDANSETRON HYDROCHLORIDE 4 MG: 4 TABLET, FILM COATED ORAL at 08:35

## 2021-09-07 NOTE — NURSING NOTE
"Patient reports to intake requesting medical detox off of suboxone. Patient reports using 4 mg of suboxone orally daily for 3 years. Her last use was 9/4/21, two days ago. Patient states her stressors are, \"Family fighting, relationship problems with my boyfriend, and I have no where to go anymore.\"  UDS positive for methamphetamine, suboxone, and THC. Patient has hepatitis C. Patient denies SI/HI/AVH. Anxiety 6/10 and depression 4/10. COWS 9. A&O x 3.   "

## 2021-09-07 NOTE — PLAN OF CARE
Problem: Adult Behavioral Health Plan of Care  Goal: Plan of Care Review  Outcome: Ongoing, Progressing  Flowsheets (Taken 9/7/2021 1031)  Consent Given to Review Plan with: Declined  Progress: no change  Plan of Care Reviewed With: patient  Patient Agreement with Plan of Care: agrees  Outcome Summary: New admit. Completed social history and integrated summary  Goal: Patient-Specific Goal (Individualization)  Outcome: Ongoing, Progressing  Flowsheets  Taken 9/7/2021 1031 by Adriana Collado  Patient-Specific Goals (Include Timeframe): Patient will deny SI/HI prior to discharge. Patient will identify 1 healthy coping skill for stress prior to discharge. Patient will consent to appropriate aftercare plan prior to discharge.  Individualized Care Needs: Therapist will offer 1-4 individual sessions, family education, aftercare planning  Taken 9/7/2021 1026 by Adriana Collado  Patient Personal Strengths:   expressive of emotions   expressive of needs   resourceful   resilient   spiritual/[place] support  Patient Vulnerabilities:   family/relationship conflict   substance abuse/addiction  Taken 9/7/2021 0004 by Dorothea Alcala RN  Anxieties, Fears or Concerns: None verbalized  Goal: Optimized Coping Skills in Response to Life Stressors  Outcome: Ongoing, Progressing  Intervention: Promote Effective Coping Strategies  Flowsheets (Taken 9/7/2021 1031)  Supportive Measures:   active listening utilized   counseling provided  Goal: Develops/Participates in Therapeutic [place] to Support Successful Transition  Outcome: Ongoing, Progressing  Intervention: Mutually Develop Transition Plan  Flowsheets  Taken 9/7/2021 1031  Outpatient/Agency/Support Group Needs: residential services  Transition Support:   community resources reviewed   follow-up care coordinated   follow-up care discussed   crisis management plan promoted   crisis management plan verbalized  Anticipated Discharge Disposition:  residential substance use unit  Taken 9/7/2021 1029  Discharge Coordination/Progress: Patient has [employer] insurance for discharge planning and signed consent for [facility]  Concerns Comments: NA  Transportation Anticipated:   public transportation   agency  Transportation Concerns: car, none  Current Discharge Risk:   psychiatric illness   substance use/abuse   homeless  Concerns to be Addressed:   mental health   substance/tobacco abuse/use   compliance issue   coping/stress   discharge planning  Readmission Within the Last 30 Days: no previous admission in last 30 days  Patient/Family Anticipated Services at Transition:   mental health services   rehabilitation services  Patient's Choice of Community Agency(s): [facility]  Patient/Family Anticipates Transition to: inpatient rehabilitation facility  Offered/Gave Vendor List: no   Goal Outcome Evaluation:  Plan of Care Reviewed With: patient  Patient Agreement with Plan of Care: agrees  Consent Given to Review Plan with: Declined  Progress: no change  Outcome Summary: New admit. Completed social history and integrated summary

## 2021-09-07 NOTE — PLAN OF CARE
Goal Outcome Evaluation:  Plan of Care Reviewed With: patient  Patient Agreement with Plan of Care: agrees     Progress: no change  Outcome Summary: Patient isolates in room, coming out for meds and meals only. Rates anxiety 6 and depression 4. Rates cravings 6 and c/o moderate w/d symptoms.

## 2021-09-07 NOTE — PROGRESS NOTES
Navigator is helping Primary Therapist with discharge planning for patient. Navigator completed the following referrals on this day:    [facility] - [phone]  -Sent 9/7  -Priya states she received referral and will work on scheduling patient for a bed on Friday. 9/7

## 2021-09-07 NOTE — ED PROVIDER NOTES
[facility]  emergency department encounter        Pt Name: Jenelle Mascorro  MRN: [id number]  Birthdate 1990  Date of evaluation: 2021    Chief Complaint   Patient presents with   • Psychiatric Evaluation             HISTORY OF PRESENT ILLNESS:   Jenelle Mascorro is a 31 y.o. female PMH hepatitis C, liver lesion, substance use disorder presents seeking assistance with detoxification from Suboxone.  Last used 2 to 3 days ago, sublingual.  Has used IVDA in the past but not ongoing.  Patient endorses general malaise, dysuria.  Notes constant, progressively worsening.      No other exacerbating or alleviating factors other than as noted above.  Severity: Mild    PCP: Provider, No Known          REVIEW OF SYSTEMS:     Review of Systems   Constitutional: Positive for fatigue. Negative for fever.   HENT: Negative for congestion and rhinorrhea.    Respiratory: Negative for cough and shortness of breath.    Cardiovascular: Negative for chest pain.   Gastrointestinal: Negative for abdominal pain, nausea and vomiting.   Genitourinary: Positive for dysuria.   Musculoskeletal: Negative for myalgias.   Skin: Negative for rash.   Neurological: Negative for headaches.   Psychiatric/Behavioral: Negative for confusion.       Review of systems otherwise as per HPI.          PREVIOUS HISTORY:         Past Medical History:   Diagnosis Date   • Anxiety    • Depression    • Substance abuse (CMS/HCC)            Past Surgical History:   Procedure Laterality Date   •  SECTION     • CHOLECYSTECTOMY     • TONSILLECTOMY             Social History     Socioeconomic History   • Marital status: Single     Spouse name: Not on file   • Number of children: Not on file   • Years of education: Not on file   • Highest education level: Not on file   Tobacco Use   • Smoking status: Current Every Day Smoker     Packs/day: 1.00     Years: 10.00     Pack years: 10.00     Types: Cigarettes   • Smokeless tobacco: Never Used    Substance and Sexual Activity   • Alcohol use: Yes     Comment: occasional   • Drug use: Yes     Types: Heroin     Comment: NO USE IN 1 YEAR   • Sexual activity: Defer           Family History   Adopted: Yes         Current Outpatient Medications   Medication Instructions   • buprenorphine-naloxone (SUBOXONE) 8-2 MG per SL tablet 1 tablet, Sublingual, Daily   • buPROPion (WELLBUTRIN) 100 mg, Oral, 2 Times Daily   • cefdinir (OMNICEF) 300 mg, Oral, 2 Times Daily   • citalopram (CELEXA) 20 mg, Oral, Daily   • diclofenac (VOLTAREN) 50 mg, Oral, 3 Times Daily PRN   • dicyclomine (BENTYL) 20 mg, Oral, Every 6 Hours PRN   • gabapentin (NEURONTIN) 600 mg, Oral, 2 Times Daily   • hydrOXYzine pamoate (VISTARIL) 25 mg, Oral, 3 Times Daily PRN   • ondansetron ODT (ZOFRAN-ODT) 4 mg, Translingual, Every 6 Hours PRN   • penicillin v potassium (VEETID) 500 mg, Oral, 4 Times Daily   • QUEtiapine (SEROQUEL) 25 mg, Oral, Nightly         Allergies:  Bactrim [sulfamethoxazole-trimethoprim] and Erythromycin    Medications, allergies and past medical, surgical, family, and social history reviewed.        PHYSICAL EXAM:     Physical Exam  Vitals and nursing note reviewed.   Constitutional:       General: She is not in acute distress.  HENT:      Head: Normocephalic and atraumatic.   Eyes:      Extraocular Movements: Extraocular movements intact.      Conjunctiva/sclera: Conjunctivae normal.   Cardiovascular:      Rate and Rhythm: Normal rate and regular rhythm.   Pulmonary:      Effort: Pulmonary effort is normal. No respiratory distress.   Abdominal:      General: Abdomen is flat. There is no distension.   Musculoskeletal:         General: No deformity. Normal range of motion.      Cervical back: Normal range of motion.   Neurological:      General: No focal deficit present.      Mental Status: She is alert and oriented to person, place, and time.   Psychiatric:         Mood and Affect: Mood normal.         Behavior: Behavior normal.              COMPLETED DIAGNOSTIC STUDIES AND INTERVENTIONS:     Lab Results (last 24 hours)     Procedure Component Value Units Date/Time    Urinalysis With Microscopic If Indicated (No Culture) - Urine, Clean Catch [[id number]]  (Abnormal) Collected: 09/06/21 2013    Specimen: Urine, Clean Catch Updated: 09/06/21 2038     Color, UA Yellow     Appearance, UA Cloudy     pH, UA 6.5     Specific Gravity, UA 1.021     Glucose, UA Negative     Ketones, UA >=160 mg/dL (4+)     Bilirubin, UA Negative     Blood, UA Moderate (2+)     Protein, UA Negative     Leuk Esterase, UA Trace     Nitrite, UA Negative     Urobilinogen, UA 1.0 E.U./dL    Urine Drug Screen - Urine, Clean Catch [[id number]]  (Abnormal) Collected: 09/06/21 2013    Specimen: Urine, Clean Catch Updated: 09/06/21 2031     THC, Screen, Urine Positive     Phencyclidine (PCP), Urine Negative     Cocaine Screen, Urine Negative     Methamphetamine, Ur Positive     Opiate Screen Negative     Amphetamine Screen, Urine Positive     Benzodiazepine Screen, Urine Negative     Tricyclic Antidepressants Screen Negative     Methadone Screen, Urine Negative     Barbiturates Screen, Urine Negative     Oxycodone Screen, Urine Negative     Propoxyphene Screen Negative     Buprenorphine, Screen, Urine Positive    Narrative:      Cutoff For Drugs Screened:    Amphetamines               500 ng/ml  Barbiturates               200 ng/ml  Benzodiazepines            150 ng/ml  Cocaine                    150 ng/ml  Methadone                  200 ng/ml  Opiates                    100 ng/ml  Phencyclidine               25 ng/ml  THC                            50 ng/ml  Methamphetamine            500 ng/ml  Tricyclic Antidepressants  300 ng/ml  Oxycodone                  100 ng/ml  Propoxyphene               300 ng/ml  Buprenorphine               10 ng/ml    The normal value for all drugs tested is negative. This report includes unconfirmed screening results, with the cutoff values listed,  to be used for medical treatment purposes only.  Unconfirmed results must not be used for non-medical purposes such as employment or legal testing.  Clinical consideration should be applied to any drug of abuse test, particularly when unconfirmed results are used.      COVID-19 and FLU A/B PCR - Swab, Nasopharynx [[id number]]  (Normal) Collected: 09/06/21 2013    Specimen: Swab from Nasopharynx Updated: 09/06/21 2040     COVID19 Not Detected     Influenza A PCR Not Detected     Influenza B PCR Not Detected    Narrative:      Fact sheet for providers: https://www.fda.gov/media/970178/download    Fact sheet for patients: https://www.fda.gov/media/302365/download    Test performed by PCR.    Urinalysis, Microscopic Only - Urine, Clean Catch [[id number]]  (Abnormal) Collected: 09/06/21 2013    Specimen: Urine, Clean Catch Updated: 09/06/21 2038     RBC, UA 6-12 /HPF      WBC, UA 3-5 /HPF      Bacteria, UA 1+ /HPF      Squamous Epithelial Cells, UA 7-12 /HPF      Hyaline Casts, UA 3-6 /LPF      Methodology Automated Microscopy    CBC & Differential [[id number]]  (Abnormal) Collected: 09/06/21 2024    Specimen: Blood from Arm, Left Updated: 09/06/21 2129    Narrative:      The following orders were created for panel order CBC & Differential.  Procedure                               Abnormality         Status                     ---------                               -----------         ------                     CBC Auto Differential[[id number]]        Abnormal            Final result               Scan Slide[[id number]]                                                                    Please view results for these tests on the individual orders.    Comprehensive Metabolic Panel [[id number]]  (Abnormal) Collected: 09/06/21 2024    Specimen: Blood from Arm, Right Updated: 09/06/21 2056     Glucose 99 mg/dL      BUN 7 mg/dL      Creatinine 0.54 mg/dL      Sodium 138 mmol/L      Potassium 4.3 mmol/L      Comment: 1+ Hemolysis   Slight hemolysis detected by analyzer. Results may be affected.        Chloride 104 mmol/L      CO2 18.5 mmol/L      Calcium 9.7 mg/dL      Total Protein 8.0 g/dL      Albumin 4.45 g/dL      ALT (SGPT) 10 U/L      AST (SGOT) 18 U/L      Alkaline Phosphatase 82 U/L      Total Bilirubin 0.8 mg/dL      eGFR Non African Amer 133 mL/min/1.73      Globulin 3.6 gm/dL      A/G Ratio 1.3 g/dL      BUN/Creatinine Ratio 13.0     Anion Gap 15.5 mmol/L     Narrative:      GFR Normal >60  Chronic Kidney Disease <60  Kidney Failure <15      Magnesium [[id number]]  (Normal) Collected: 09/06/21 2024    Specimen: Blood from Arm, Right Updated: 09/06/21 2053     Magnesium 2.0 mg/dL     Ethanol [[id number]] Collected: 09/06/21 2024    Specimen: Blood from Arm, Right Updated: 09/06/21 2053     Ethanol <10 mg/dL      Ethanol % <0.010 %     Narrative:      >/= 80.0 legally intoxicated    hCG, Serum, Qualitative [[id number]]  (Normal) Collected: 09/06/21 2024    Specimen: Blood from Arm, Right Updated: 09/06/21 2045     HCG Qualitative Negative    CBC Auto Differential [[id number]]  (Abnormal) Collected: 09/06/21 2116    Specimen: Blood from Arm, Left Updated: 09/06/21 2129     WBC 10.85 10*3/mm3      RBC 5.36 10*6/mm3      Hemoglobin 14.2 g/dL      Hematocrit 44.9 %      MCV 83.8 fL      MCH 26.5 pg      MCHC 31.6 g/dL      RDW 14.2 %      RDW-SD 43.3 fl      MPV 9.7 fL      Platelets 351 10*3/mm3      Neutrophil % 76.2 %      Lymphocyte % 17.1 %      Monocyte % 4.6 %      Eosinophil % 1.1 %      Basophil % 0.7 %      Immature Grans % 0.3 %      Neutrophils, Absolute 8.27 10*3/mm3      Lymphocytes, Absolute 1.85 10*3/mm3      Monocytes, Absolute 0.50 10*3/mm3      Eosinophils, Absolute 0.12 10*3/mm3      Basophils, Absolute 0.08 10*3/mm3      Immature Grans, Absolute 0.03 10*3/mm3      nRBC 0.0 /100 WBC             No orders to display         No orders of the defined types were placed in this encounter.        Procedures            MEDICAL  DECISION-MAKING AND ED COURSE:     ED Course as of Sep 06 2146   Mon Sep 06, 2021   2143 MDM: 31-year-old female presents for Suboxone detox, lab work nondiagnostic or unremarkable, accepted by psych.    []      ED Course User Index  [KP] Jose Reid MD       ?      FINAL IMPRESSION:       1. Admitted to substance misuse detoxification center    2. Withdrawal complaint         The complaints listed here are new problems to this examiner.      FOLLOW-UP  No follow-up provider specified.    DISPOSITION  ED Disposition     ED Disposition Condition Comment    Discharge to Behavioral Health Stable                 This care is provided during an unprecedented national emergency due to the Novel Coronavirus (COVID-19). COVID-19 infections and transmission risks place heavy strains on healthcare resources. As this pandemic evolves, the Hospital and providers strive to respond fluidly, to remain operational, and to provide care relative to available resources and information. Outcomes are unpredictable and treatments are without well-defined guidelines. Further, the impact of COVID-19 on all aspects of emergency care, including the impact to patients seeking care for reasons other than COVID-19, is unavoidable during this national emergency.    This note was dictated using a [id number] tool. Occasional wrong-word or 'sound-a-like' substitutions may have occurred due to the inherent limitations of voice recognition software. ?Read the chart carefully and recognize, using context, where substitutions have occurred.    Jose Reid MD  21:46 EDT  9/6/2021             Jose Reid MD  09/06/21 2146

## 2021-09-07 NOTE — H&P
INITIAL PSYCHIATRIC HISTORY & PHYSICAL    Patient Identification:  Name:   Jenelle Mascorro  Age:   31 y.o.  Sex:   female  :   1990  MRN:   [id number]  Visit Number:   [id number]  Primary Care Physician:   Provider, No Known    SUBJECTIVE    CC/Focus of Exam: opiate dependence    HPI: Jenelle Mascorro is a 31 y.o. female who was admitted on 2021 with complaints of opiate use and withdrawals. The patient reports a long history of substance use. First use was 19 years old. Over time the use increased and the patient  continued to use despite negative consequences. The patient endorses symptoms of tolerance and withdrawals. Has tried to cut down and stop but has not been successful. Spends too much time and resources in pursuit of substance use. Longest period of sobriety is reported to be 6 months.  Currently using 4 mg suboxone, meth, marijuana  Last use 2021  Withdrawal symptoms cramping,body aches  Patient denies any alcohol abuse. Patient states that she uses tobacco. Patient denies any history of seizures with withdrawal. Patient states that she doesn't know why she relapsed. Patient states family fighting, relationship issues, and housing concerns as stressors in her life. Patient denies any history of physical,mental or sexual abuse. Patient rates her appetite as fair. Patient rates her sleep as fair. Patient denies any nightmares. Patient rates her anxiety on a scale of 1/10 with 10 being the most severe a 6. Patient rates her depression on a scale of 1/10 with 10 being the most severe a 4. Patient rates her cravings on a scale of 1/10 with 10 being the most severe a 7. Patient's COWS was 9. Patient denies any suicidal ideation. Patient denies any homicidal ideation. Patient denies any hallucinations. Patient was admitted to [facility] psychiatry for further safety and stabilization.    Available medical/psychiatric records reviewed and incorporated into the current document.      PAST PSYCHIATRIC HX: Patient has had no prior admissions. Patient denies any outpatient care.    SUBSTANCE USE HX: UDS was positive for Methamphetamine, Amphetamine, Buprenorphine, THC. See HPI for current use.    SOCIAL HX: Patient states that she was born in [place], [place]. Patient states that she was raised in [place], [place]. Patient states that she is currently homeless. Patient states that she is single and has 4 children who lives with their grandparents. Patient states that she is currently unemployed. Patient states that she has a 10th grade education. Patient denies any legal issues.    Past Medical History:   Diagnosis Date   • Anxiety    • Depression    • Hepatitis C    • Substance abuse (CMS/HCC)        Past Surgical History:   Procedure Laterality Date   •  SECTION     • CHOLECYSTECTOMY     • TONSILLECTOMY         Family History   Adopted: Yes         Medications Prior to Admission   Medication Sig Dispense Refill Last Dose   • dicyclomine (BENTYL) 20 MG tablet Take 1 tablet by mouth Every 6 (Six) Hours As Needed (abdominal pain). 20 tablet 0 Past Week at Unknown time   • ondansetron ODT (ZOFRAN-ODT) 4 MG disintegrating tablet Place 1 tablet on the tongue Every 6 (Six) Hours As Needed for Vomiting (abdominal pain). 12 tablet 0 Past Week at Unknown time           ALLERGIES:  Bactrim [sulfamethoxazole-trimethoprim] and Erythromycin    Temp:  [97.6 °F (36.4 °C)-98.7 °F (37.1 °C)] 97.6 °F (36.4 °C)  Heart Rate:  [51-75] 75  Resp:  [16-20] 18  BP: (114-140)/(62-83) 126/82    REVIEW OF SYSTEMS:  Review of Systems   Constitutional: Positive for chills, diaphoresis and fatigue.   HENT: Positive for dental problem.    Eyes: Negative.    Respiratory: Negative.    Cardiovascular: Negative.    Gastrointestinal: Positive for abdominal pain and nausea.   Endocrine: Negative.    Genitourinary: Negative.    Musculoskeletal: Positive for arthralgias, back pain and myalgias.   Skin: Negative.     Allergic/Immunologic: Negative.    Neurological: Positive for tremors and weakness.   Hematological: Negative.    Psychiatric/Behavioral: Positive for dysphoric mood. The patient is nervous/anxious.         OBJECTIVE    PHYSICAL EXAM:  Physical Exam  Constitutional:  Appears well-developed and well-nourished.   HENT:   Head: Normocephalic and atraumatic.   Right Ear: External ear normal.   Left Ear: External ear normal.   Mouth/Throat: Oropharynx is clear and moist.   Eyes: Pupils are equal, round, and reactive to light. Conjunctivae and EOM are normal.   Neck: Normal range of motion. Neck supple.   Cardiovascular: Normal rate, regular rhythm and normal heart sounds.    Respiratory: Effort normal and breath sounds normal. No respiratory distress. No wheezes.   GI: Soft. Bowel sounds are normal.No distension. There is no tenderness.   Musculoskeletal: Normal range of motion. No edema or deformity.   Neurological:No cranial nerve deficit. Coordination normal.   Skin: Skin is warm and dry. No rash noted. No erythema.       MENTAL STATUS EXAM:   Hygiene:   good  Cooperation:  Cooperative  Eye Contact:  Good  Psychomotor Behavior:  Appropriate  Affect:  Appropriate  Hopelessness: 4  Speech:  Normal  Thought Progress:  Goal directed  Thought Content:  Normal  Suicidal:  None  Homicidal:  None  Hallucinations:  None  Delusion:  None  Memory:  Intact  Orientation:  Person, Place, Time and Situation  Reliability:  fair  Insight:  Good  Judgement:  Poor  Impulse Control:  Poor      Imaging Results (Last 24 Hours)     ** No results found for the last 24 hours. **           ECG/EMG Results (most recent)     Procedure Component Value Units Date/Time    ECG 12 Lead [[id number]] Collected: 09/07/21 0007     Updated: 09/07/21 0017     QT Interval 470 ms      QTC Interval 437 ms     Narrative:      Test Reason : Baseline Cardiac Status  Blood Pressure :   */*   mmHG  Vent. Rate :  52 BPM     Atrial Rate :  52 BPM     P-R Int : 132  ms          QRS Dur : 100 ms      QT Int : 470 ms       P-R-T Axes :  36  70  51 degrees     QTc Int : 437 ms    Sinus bradycardia with marked sinus arrhythmia  Otherwise normal ECG  No previous ECGs available    Referred By: RUSH           Confirmed By:            Lab Results   Component Value Date    GLUCOSE 99 09/06/2021    BUN 7 09/06/2021    CREATININE 0.54 (L) 09/06/2021    EGFRIFNONA 133 09/06/2021    BCR 13.0 09/06/2021    CO2 18.5 (L) 09/06/2021    CALCIUM 9.7 09/06/2021    ALBUMIN 4.45 09/06/2021    LABIL2 1.3 (L) 04/14/2015    AST 18 09/06/2021    ALT 10 09/06/2021       Lab Results   Component Value Date    WBC 10.85 (H) 09/06/2021    HGB 14.2 09/06/2021    HCT 44.9 09/06/2021    MCV 83.8 09/06/2021     09/06/2021       Pain Management Panel     Pain Management Panel Latest Ref Rng & Units 9/6/2021 6/19/2021    AMPHETAMINES SCREEN, URINE Negative Positive(A) Positive(A)    BARBITURATES SCREEN Negative Negative Negative    BENZODIAZEPINE SCREEN, URINE Negative Negative Positive(A)    BUPRENORPHINEUR Negative Positive(A) Positive(A)    COCAINE SCREEN, URINE Negative Negative Negative    METHADONE SCREEN, URINE Negative Negative Negative    METHAMPHETAMINEUR Negative Positive(A) -          Brief Urine Lab Results  (Last result in the past 365 days)      Color   Clarity   Blood   Leuk Est   Nitrite   Protein   CREAT   Urine HCG        09/06/21 2013 Yellow Cloudy Moderate (2+) Trace Negative Negative               DATA  Labs reviewed. WBC 10.85, UA shows cloudy appearance, 4+ ketones, 2+ blood, trace leukocyte esterase, 6-12 RBCs, 3-5 WBCs, 1+ bacteria, 7-12 sq epi cells. UDS positive for amphetamine, buprenorphine , methamphetamine, thc.   EKG reviewed. QTc 437.   DEBBIE reviewed. No controlled rx noted.  Record reviewed. No previous inpatient treatments noted in this hospital for substance use or psychiatric problems.      ASSESSMENT & PLAN:        Opioid use disorder, severe, dependence  (CMS/HCC)  - Clonidine detox      Methamphetamine use disorder, severe (CMS/HCC)  - Supportive treatment  - Encourage cessation      Tetrahydrocannabinol (THC) use disorder, severe, dependence (CMS/HCC)  - Supportive treatment  - Encourage cessation      Nicotine use disorder  - Encourage cessation      Hepatitis C  - Patient reports no previous treatment. She had an appt with GI clinic but didn't make it. She is encouraged to maintain sobriety and continue outpatient treatment.        The patient has been admitted for safety and stabilization.  Patient will be monitored for suicidality daily and maintained on Special Precautions Level 4 (q30 min checks).  The patient will have individual and group therapy with a master's level therapist. A master treatment plan will be developed and agreed upon by the patient and his/her treatment team.  The patient's estimated length of stay in the hospital is 5-7 days.       Written by Diana Saunders acting as scribe for Dr.Mazhar Reyes signature on this note affirms that the note adequately documents the care provided.     Diana Saunders MA  09/07/21  9:00 AM EDT    IAndre MD, personally performed the services described in this documentation as scribed by the above named individual in my presence, and it is both accurate and complete.

## 2021-09-07 NOTE — DISCHARGE PLACEMENT REQUEST
"Jenelle Parker (31 y.o. Female)     Date of Birth Social Security Number Address Home Phone MRN    1990  [address]   [place] [address] [phone] [id number]    Adventism Marital Status          None Single       Admission Date Admission Type Admitting Provider Attending Provider Department, Room/Bed    21 Emergency Miguel George MD Briscoe, Brian T, MD [facility] ADULT CD, 1032/2S    Discharge Date Discharge Disposition Discharge Destination                       Attending Provider: Miguel George MD    Allergies: Bactrim [Sulfamethoxazole-trimethoprim], Erythromycin    Isolation: None   Infection: None   Code Status: CPR    Ht: 165.1 cm (65\")   Wt: 55.1 kg (121 lb 6.4 oz)    Admission Cmt: None   Principal Problem: Opioid use disorder, severe, dependence (CMS/HCC) [F11.20]                 Active Insurance as of 2021     Primary Coverage     Payor Plan Insurance Group Employer/Plan Group    [facility] [employer] [place] [facility] [employer] [place]      Payor Plan Address Payor Plan Phone Number Payor Plan Fax Number Effective Dates    [address] [address]   3/12/2019 - None Entered    [place] [address]       Subscriber Name Subscriber Birth Date Member ID       JENELLE PARKER 1990 [id number]                 Emergency Contacts      (Rel.) Home Phone Work Phone Mobile Phone    OC MONGE (Significant Other) [phone] -- --               History & Physical      Andre Reyes MD at 21 0900          INITIAL PSYCHIATRIC HISTORY & PHYSICAL    Patient Identification:  Name:   Jenelle Parker  Age:   31 y.o.  Sex:   female  :   1990  MRN:   [id number]  Visit Number:   [id number]  Primary Care Physician:   Provider, No Known    SUBJECTIVE    CC/Focus of Exam: opiate dependence    HPI: Jenelle Parker is a 31 y.o. female who was admitted on 2021 with complaints of opiate use and withdrawals. The patient reports a long history of substance use. " First use was 19 years old. Over time the use increased and the patient  continued to use despite negative consequences. The patient endorses symptoms of tolerance and withdrawals. Has tried to cut down and stop but has not been successful. Spends too much time and resources in pursuit of substance use. Longest period of sobriety is reported to be 6 months.  Currently using 4 mg suboxone, meth, marijuana  Last use 09-  Withdrawal symptoms cramping,body aches  Patient denies any alcohol abuse. Patient states that she uses tobacco. Patient denies any history of seizures with withdrawal. Patient states that she doesn't know why she relapsed. Patient states family fighting, relationship issues, and housing concerns as stressors in her life. Patient denies any history of physical,mental or sexual abuse. Patient rates her appetite as fair. Patient rates her sleep as fair. Patient denies any nightmares. Patient rates her anxiety on a scale of 1/10 with 10 being the most severe a 6. Patient rates her depression on a scale of 1/10 with 10 being the most severe a 4. Patient rates her cravings on a scale of 1/10 with 10 being the most severe a 7. Patient's COWS was 9. Patient denies any suicidal ideation. Patient denies any homicidal ideation. Patient denies any hallucinations. Patient was admitted to [facility] psychiatry for further safety and stabilization.    Available medical/psychiatric records reviewed and incorporated into the current document.     PAST PSYCHIATRIC HX: Patient has had no prior admissions. Patient denies any outpatient care.    SUBSTANCE USE HX: UDS was positive for Methamphetamine, Amphetamine, Buprenorphine, THC. See HPI for current use.    SOCIAL HX: Patient states that she was born in [place], [place]. Patient states that she was raised in [place], [place]. Patient states that she is currently homeless. Patient states that she is single and has 4 children who lives with their grandparents.  Patient states that she is currently unemployed. Patient states that she has a 10th grade education. Patient denies any legal issues.    Past Medical History:   Diagnosis Date   • Anxiety    • Depression    • Hepatitis C    • Substance abuse (CMS/HCC)        Past Surgical History:   Procedure Laterality Date   •  SECTION     • CHOLECYSTECTOMY     • TONSILLECTOMY         Family History   Adopted: Yes         Medications Prior to Admission   Medication Sig Dispense Refill Last Dose   • dicyclomine (BENTYL) 20 MG tablet Take 1 tablet by mouth Every 6 (Six) Hours As Needed (abdominal pain). 20 tablet 0 Past Week at Unknown time   • ondansetron ODT (ZOFRAN-ODT) 4 MG disintegrating tablet Place 1 tablet on the tongue Every 6 (Six) Hours As Needed for Vomiting (abdominal pain). 12 tablet 0 Past Week at Unknown time           ALLERGIES:  Bactrim [sulfamethoxazole-trimethoprim] and Erythromycin    Temp:  [97.6 °F (36.4 °C)-98.7 °F (37.1 °C)] 97.6 °F (36.4 °C)  Heart Rate:  [51-75] 75  Resp:  [16-20] 18  BP: (114-140)/(62-83) 126/82    REVIEW OF SYSTEMS:  Review of Systems   Constitutional: Positive for chills, diaphoresis and fatigue.   HENT: Positive for dental problem.    Eyes: Negative.    Respiratory: Negative.    Cardiovascular: Negative.    Gastrointestinal: Positive for abdominal pain and nausea.   Endocrine: Negative.    Genitourinary: Negative.    Musculoskeletal: Positive for arthralgias, back pain and myalgias.   Skin: Negative.    Allergic/Immunologic: Negative.    Neurological: Positive for tremors and weakness.   Hematological: Negative.    Psychiatric/Behavioral: Positive for dysphoric mood. The patient is nervous/anxious.         OBJECTIVE    PHYSICAL EXAM:  Physical Exam  Constitutional:  Appears well-developed and well-nourished.   HENT:   Head: Normocephalic and atraumatic.   Right Ear: External ear normal.   Left Ear: External ear normal.   Mouth/Throat: Oropharynx is clear and moist.   Eyes:  Pupils are equal, round, and reactive to light. Conjunctivae and EOM are normal.   Neck: Normal range of motion. Neck supple.   Cardiovascular: Normal rate, regular rhythm and normal heart sounds.    Respiratory: Effort normal and breath sounds normal. No respiratory distress. No wheezes.   GI: Soft. Bowel sounds are normal.No distension. There is no tenderness.   Musculoskeletal: Normal range of motion. No edema or deformity.   Neurological:No cranial nerve deficit. Coordination normal.   Skin: Skin is warm and dry. No rash noted. No erythema.       MENTAL STATUS EXAM:   Hygiene:   good  Cooperation:  Cooperative  Eye Contact:  Good  Psychomotor Behavior:  Appropriate  Affect:  Appropriate  Hopelessness: 4  Speech:  Normal  Thought Progress:  Goal directed  Thought Content:  Normal  Suicidal:  None  Homicidal:  None  Hallucinations:  None  Delusion:  None  Memory:  Intact  Orientation:  Person, Place, Time and Situation  Reliability:  fair  Insight:  Good  Judgement:  Poor  Impulse Control:  Poor      Imaging Results (Last 24 Hours)     ** No results found for the last 24 hours. **           ECG/EMG Results (most recent)     Procedure Component Value Units Date/Time    ECG 12 Lead [[id number]] Collected: 09/07/21 0007     Updated: 09/07/21 0017     QT Interval 470 ms      QTC Interval 437 ms     Narrative:      Test Reason : Baseline Cardiac Status  Blood Pressure :   */*   mmHG  Vent. Rate :  52 BPM     Atrial Rate :  52 BPM     P-R Int : 132 ms          QRS Dur : 100 ms      QT Int : 470 ms       P-R-T Axes :  36  70  51 degrees     QTc Int : 437 ms    Sinus bradycardia with marked sinus arrhythmia  Otherwise normal ECG  No previous ECGs available    Referred By: RUSH           Confirmed By:            Lab Results   Component Value Date    GLUCOSE 99 09/06/2021    BUN 7 09/06/2021    CREATININE 0.54 (L) 09/06/2021    EGFRIFNONA 133 09/06/2021    BCR 13.0 09/06/2021    CO2 18.5 (L) 09/06/2021    CALCIUM 9.7  09/06/2021    ALBUMIN 4.45 09/06/2021    LABIL2 1.3 (L) 04/14/2015    AST 18 09/06/2021    ALT 10 09/06/2021       Lab Results   Component Value Date    WBC 10.85 (H) 09/06/2021    HGB 14.2 09/06/2021    HCT 44.9 09/06/2021    MCV 83.8 09/06/2021     09/06/2021       Pain Management Panel     Pain Management Panel Latest Ref Rng & Units 9/6/2021 6/19/2021    AMPHETAMINES SCREEN, URINE Negative Positive(A) Positive(A)    BARBITURATES SCREEN Negative Negative Negative    BENZODIAZEPINE SCREEN, URINE Negative Negative Positive(A)    BUPRENORPHINEUR Negative Positive(A) Positive(A)    COCAINE SCREEN, URINE Negative Negative Negative    METHADONE SCREEN, URINE Negative Negative Negative    METHAMPHETAMINEUR Negative Positive(A) -          Brief Urine Lab Results  (Last result in the past 365 days)      Color   Clarity   Blood   Leuk Est   Nitrite   Protein   CREAT   Urine HCG        09/06/21 2013 Yellow Cloudy Moderate (2+) Trace Negative Negative               DATA  Labs reviewed. WBC 10.85, UA shows cloudy appearance, 4+ ketones, 2+ blood, trace leukocyte esterase, 6-12 RBCs, 3-5 WBCs, 1+ bacteria, 7-12 sq epi cells. UDS positive for amphetamine, buprenorphine , methamphetamine, thc.   EKG reviewed. QTc 437.   DEBBIE reviewed. No controlled rx noted.  Record reviewed. No previous inpatient treatments noted in this hospital for substance use or psychiatric problems.      ASSESSMENT & PLAN:        Opioid use disorder, severe, dependence (CMS/HCC)  - Clonidine detox      Methamphetamine use disorder, severe (CMS/HCC)  - Supportive treatment  - Encourage cessation      Tetrahydrocannabinol (THC) use disorder, severe, dependence (CMS/HCC)  - Supportive treatment  - Encourage cessation      Nicotine use disorder  - Encourage cessation      Hepatitis C  - Patient reports no previous treatment. She had an appt with GI clinic but didn't make it. She is encouraged to maintain sobriety and continue outpatient  treatment.        The patient has been admitted for safety and stabilization.  Patient will be monitored for suicidality daily and maintained on Special Precautions Level 4 (q30 min checks).  The patient will have individual and group therapy with a master's level therapist. A master treatment plan will be developed and agreed upon by the patient and his/her treatment team.  The patient's estimated length of stay in the hospital is 5-7 days.       Written by Diana Saunders acting as scribe for Dr.Mazhar Reyes signature on this note affirms that the note adequately documents the care provided.     Diana Saunders MA  09/07/21  9:00 AM EDT    Andre JARAMILLO MD, personally performed the services described in this documentation as scribed by the above named individual in my presence, and it is both accurate and complete.        Electronically signed by Andre Reyes MD at 09/07/21 1220         Current Facility-Administered Medications   Medication Dose Route Frequency Provider Last Rate Last Admin   • acetaminophen (TYLENOL) tablet 650 mg  650 mg Oral Q6H PRN Miguel George MD       • aluminum-magnesium hydroxide-simethicone (MAALOX MAX) 400-400-40 MG/5ML suspension 15 mL  15 mL Oral Q6H PRN Miguel George MD       • benzonatate (TESSALON) capsule 100 mg  100 mg Oral TID PRN Miguel George MD       • benztropine (COGENTIN) tablet 2 mg  2 mg Oral Once PRN Miguel George MD        Or   • benztropine (COGENTIN) injection 1 mg  1 mg Intramuscular Once PRN Miguel George MD       • cloNIDine (CATAPRES) tablet 0.1 mg  0.1 mg Oral 4x Daily PRN Miguel George MD        Followed by   • [START ON 9/8/2021] cloNIDine (CATAPRES) tablet 0.1 mg  0.1 mg Oral TID PRN Miguel George MD        Followed by   • [START ON 9/9/2021] cloNIDine (CATAPRES) tablet 0.1 mg  0.1 mg Oral BID PRN Miguel George MD        Followed by   • [START ON 9/10/2021] cloNIDine (CATAPRES) tablet 0.1 mg  0.1 mg Oral Daily PRN  Miguel George MD       • cyclobenzaprine (FLEXERIL) tablet 10 mg  10 mg Oral TID PRN Miguel George MD   10 mg at 09/07/21 0835   • dicyclomine (BENTYL) tablet 20 mg  20 mg Oral Q6H PRN Andre Reyes MD       • famotidine (PEPCID) tablet 20 mg  20 mg Oral BID PRN Miguel George MD       • hydrOXYzine (ATARAX) tablet 50 mg  50 mg Oral Q6H PRN Miguel George MD   50 mg at 09/07/21 0835   • ibuprofen (ADVIL,MOTRIN) tablet 400 mg  400 mg Oral Q6H PRN Miguel George MD   400 mg at 09/07/21 0835   • loperamide (IMODIUM) capsule 2 mg  2 mg Oral Q2H PRN Miguel George MD       • magnesium hydroxide (MILK OF MAGNESIA) suspension 2400 mg/10mL 10 mL  10 mL Oral Daily PRN Miguel George MD       • ondansetron (ZOFRAN) tablet 4 mg  4 mg Oral Q6H PRN Miguel George MD   4 mg at 09/07/21 0835   • sodium chloride nasal spray 2 spray  2 spray Each Nare PRN Miguel George MD       • traZODone (DESYREL) tablet 50 mg  50 mg Oral Nightly PRN Miguel George MD         Physician Progress Notes (last 24 hours) (Notes from 09/06/21 1333 through 09/07/21 1333)    No notes of this type exist for this encounter.

## 2021-09-07 NOTE — PLAN OF CARE
Problem: Adult Behavioral Health Plan of Care  Goal: Plan of Care Review  Recent Flowsheet Documentation  Taken 9/7/2021 0004 by Dorothea Alcala RN  Plan of Care Reviewed With: patient  Patient Agreement with Plan of Care: agrees   Goal Outcome Evaluation:  Plan of Care Reviewed With: patient  Patient Agreement with Plan of Care: agrees         New admission.  Care plan initiated.

## 2021-09-07 NOTE — PROGRESS NOTES
0930:     DATA:         Therapist met individually with patient this date to introduce role and to discuss hospitalization expectations. Patient agreeable. Reviewed medical record this date. Patient declines family involvement.      Clinical Maneuvering/Intervention:     Therapist assisted patient in processing above session content; acknowledged and normalized patient’s thoughts, feelings, and concerns.  Discussed the therapist/patient relationship and explain the parameters and limitations of relative confidentiality.  Also discussed the importance of active participation, and honesty to the treatment process.  Encouraged the patient to discuss/vent their feelings, frustrations, and fears concerning their ongoing medical issues and validated their feelings.     Allowed patient to freely discuss issues without interruption or judgment. Provided safe, confidential environment to facilitate the development of positive therapeutic relationship and encourage open, honest communication.      Therapist addressed discharge safety planning this date. Assisted patient in identifying risk factors which would indicate the need for higher level of care after discharge;  including thoughts to harm self or others and/or self-harming behavior. Encouraged patient to call 911, or present to the nearest emergency room should any of these events occur. Discussed crisis intervention services and means to access.  Encouraged securing any objects of harm.       Therapist completed integrated summary, treatment plan, and initiated social history this date.  Therapist is strongly encouraging family involvement in treatment.       ASSESSMENT:      The patient is a 31 year old  female. Patient is 10th grade educated and currently homeless and unemployed.  Today, patient was seen 1-1 in the office. Patient calm/cooperative and quiet. Patient reports that she is here today to get off of Suboxone. She had previously been attending a  [facility] Clinic, but most recently buying Suboxone off the street.  Patient reports that she uses 4 mg of Suboxone orally for 3 years. Her last use is reported to be 9/4/21.  Patient reports that her current stressors are lack of family support and relationship conflict with boyfriend. She is currently homeless and has no where to go.  Patient denies SI/HI/AVH.  She reports some body aches and upset stomach today.  Patient reports that she would be willing to consider rehab and signed consent for [facility].      PLAN:       Patient to remain hospitalized this date.     Treatment team will focus efforts on stabilizing patient's acute symptoms while providing education on healthy coping and crisis management to reduce hospitalizations.   Patient requires daily psychiatrist evaluation and 24/7 nursing supervision to promote patient  safety.     Therapist will offer 1-4 individual sessions, 1 therapy group daily, family education, and appropriate referral.    Therapist recommends residential referral. Patient signed for [facility] referral.

## 2021-09-07 NOTE — NURSING NOTE
Spoke to Dr. George via phone. Intake information provided. Instructed to admit the patient. Admit orders received. SP4 routine orders RBVOx2. Clonidine detox. Patient and ed provider made aware of plan of care. Safety precautions maintained.

## 2021-09-08 VITALS
RESPIRATION RATE: 16 BRPM | OXYGEN SATURATION: 98 % | HEIGHT: 65 IN | WEIGHT: 121.4 LBS | BODY MASS INDEX: 20.23 KG/M2 | SYSTOLIC BLOOD PRESSURE: 126 MMHG | DIASTOLIC BLOOD PRESSURE: 79 MMHG | TEMPERATURE: 97.6 F | HEART RATE: 74 BPM

## 2021-09-08 PROCEDURE — 63710000001 ONDANSETRON PER 8 MG: Performed by: PSYCHIATRY & NEUROLOGY

## 2021-09-08 PROCEDURE — 99238 HOSP IP/OBS DSCHRG MGMT 30/<: CPT | Performed by: PSYCHIATRY & NEUROLOGY

## 2021-09-08 RX ORDER — NALOXONE HYDROCHLORIDE 4 MG/.1ML
1 SPRAY NASAL AS NEEDED
Qty: 2 EACH | Refills: 0 | Status: SHIPPED | OUTPATIENT
Start: 2021-09-08 | End: 2021-09-09

## 2021-09-08 RX ADMIN — HYDROXYZINE HYDROCHLORIDE 50 MG: 50 TABLET ORAL at 08:07

## 2021-09-08 RX ADMIN — IBUPROFEN 400 MG: 400 TABLET, FILM COATED ORAL at 08:07

## 2021-09-08 RX ADMIN — HYDROXYZINE HYDROCHLORIDE 50 MG: 50 TABLET ORAL at 14:34

## 2021-09-08 RX ADMIN — ONDANSETRON HYDROCHLORIDE 4 MG: 4 TABLET, FILM COATED ORAL at 14:34

## 2021-09-08 RX ADMIN — CYCLOBENZAPRINE 10 MG: 10 TABLET, FILM COATED ORAL at 08:07

## 2021-09-08 NOTE — PROGRESS NOTES
The patient received naloxone education as part of group.  The patient received verbal and written education on the following:   - Risk factors of opioid overdose  - Strategies to prevent opioid overdose  - Signs of opioid overdose  - Steps in responding to an overdose   - Information about naloxone  - Procedures for administering naloxone  - Proper storage and expiration of the naloxone product dispensed      Pursuant to the [place] [employer] administrative regulation 201 MICHAEL 2:360, we will dispense:      Narcan® (naloxone HCl) 4mg/0.1mL nasal spray   Dispense #1 box (2 doses)    Sig: Call 911   Do not prime.  Spray into nostril upon signs of opioid overdose.     May repeat in 2-3 minutes in the opposite nostril if no or minimal breathing and  responsiveness, then as needed (if doses are available) every 2-3 minutes.      The signed protocol is kept in the [facility] [address]     The patient was given the opportunity to ask questions.  All questions were addressed.  The patient expressed understanding of the education provided.      Annie Guerrero, [place]  09/08/21  14:11 EDT

## 2021-09-08 NOTE — PROGRESS NOTES
1300    Therapist met individually with patient to work on referral process. Patient now states that she would like to leave today. She reports that she has worked things out with her boyfriend. Therapist encouraged patient to complete detox and to enter [facility] directly.  Patient reports that she has the contact for the liaison and will follow up on her own.  She has been educated that she may get declined if she does not enter rehab directly.  Patient denied this being an issue and stated that she planned to have Dr. Reyes discharge her later today. Patient denies acute withdrawal symptoms, SI/HI and AVH.

## 2021-09-08 NOTE — DISCHARGE SUMMARY
":  1990  MRN:  [id number]  Visit Number:  [id number]      Date of Admission:2021   Date of Discharge:  2021    Discharge Diagnosis:  Principal Problem:    Opioid use disorder, severe, dependence (CMS/HCC)  Active Problems:    Methamphetamine use disorder, severe (CMS/HCC)    Tetrahydrocannabinol (THC) use disorder, severe, dependence (CMS/HCC)    Nicotine use disorder    Hepatitis C        Admission Diagnosis:  Opiate dependence (CMS/HCC) [F11.20]     KINA Mascorro is a 31 y.o. female who was admitted on 2021 with complaints of opiate use and withdrawals. For details please see H&P dated 21.       Hospital Course  Patient is a 31 y.o. female presented with opioid and methamphetamine use and withdrawals. The patient was admitted to the detox recovery unit and started on clonidine detox. The patient had rapid improvement in her reported withdrawals symptoms after she was able to get a good night's rest. It appeared she was taking more meth than opioids. She felt ready to go back and continue outpatient treatment. .      Mental Status Exam upon discharge:   Mood \"good\"   Affect-congruent, appropriate, stable  Thought Content-goal directed, no delusional material present  Thought process-linear, organized.  Suicidality: No SI  Homicidality: No HI  Perception: No AH/    Procedures Performed         Consults:   Consults     No orders found from 2021 to 2021.          Pertinent Test Results:   Admission on 2021   Component Date Value Ref Range Status   • QT Interval 2021 470  ms Final   • QTC Interval 2021 437  ms Final   Admission on 2021, Discharged on 2021   Component Date Value Ref Range Status   • Glucose 2021 99  65 - 99 mg/dL Final   • BUN 2021 7  6 - 20 mg/dL Final   • Creatinine 2021 0.54* 0.57 - 1.00 mg/dL Final   • Sodium 2021 138  136 - 145 mmol/L Final   • Potassium 2021 4.3  3.5 - 5.2 mmol/L Final    1+ " Hemolysis  Slight hemolysis detected by analyzer. Results may be affected.   • Chloride 09/06/2021 104  98 - 107 mmol/L Final   • CO2 09/06/2021 18.5* 22.0 - 29.0 mmol/L Final   • Calcium 09/06/2021 9.7  8.6 - 10.5 mg/dL Final   • Total Protein 09/06/2021 8.0  6.0 - 8.5 g/dL Final   • Albumin 09/06/2021 4.45  3.50 - 5.20 g/dL Final   • ALT (SGPT) 09/06/2021 10  1 - 33 U/L Final   • AST (SGOT) 09/06/2021 18  1 - 32 U/L Final   • Alkaline Phosphatase 09/06/2021 82  39 - 117 U/L Final   • Total Bilirubin 09/06/2021 0.8  0.0 - 1.2 mg/dL Final   • eGFR Non African Amer 09/06/2021 133  >60 mL/min/1.73 Final   • Globulin 09/06/2021 3.6  gm/dL Final   • A/G Ratio 09/06/2021 1.3  g/dL Final   • BUN/Creatinine Ratio 09/06/2021 13.0  7.0 - 25.0 Final   • Anion Gap 09/06/2021 15.5* 5.0 - 15.0 mmol/L Final   • Color, UA 09/06/2021 Yellow  Yellow, Straw Final   • Appearance, UA 09/06/2021 Cloudy* Clear Final   • pH, UA 09/06/2021 6.5  5.0 - 8.0 Final   • Specific Gravity, UA 09/06/2021 1.021  1.005 - 1.030 Final   • Glucose, UA 09/06/2021 Negative  Negative Final   • Ketones, UA 09/06/2021 >=160 mg/dL (4+)* Negative Final   • Bilirubin, UA 09/06/2021 Negative  Negative Final   • Blood, UA 09/06/2021 Moderate (2+)* Negative Final   • Protein, UA 09/06/2021 Negative  Negative Final   • Leuk Esterase, UA 09/06/2021 Trace* Negative Final   • Nitrite, UA 09/06/2021 Negative  Negative Final   • Urobilinogen, UA 09/06/2021 1.0 E.U./dL  0.2 - 1.0 E.U./dL Final   • THC, Screen, Urine 09/06/2021 Positive* Negative Final   • Phencyclidine (PCP), Urine 09/06/2021 Negative  Negative Final   • Cocaine Screen, Urine 09/06/2021 Negative  Negative Final   • Methamphetamine, Ur 09/06/2021 Positive* Negative Final   • Opiate Screen 09/06/2021 Negative  Negative Final   • Amphetamine Screen, Urine 09/06/2021 Positive* Negative Final   • Benzodiazepine Screen, Urine 09/06/2021 Negative  Negative Final   • Tricyclic Antidepressants Screen 09/06/2021  Negative  Negative Final   • Methadone Screen, Urine 09/06/2021 Negative  Negative Final   • Barbiturates Screen, Urine 09/06/2021 Negative  Negative Final   • Oxycodone Screen, Urine 09/06/2021 Negative  Negative Final   • Propoxyphene Screen 09/06/2021 Negative  Negative Final   • Buprenorphine, Screen, Urine 09/06/2021 Positive* Negative Final   • Magnesium 09/06/2021 2.0  1.6 - 2.6 mg/dL Final   • Ethanol 09/06/2021 <10  0 - 10 mg/dL Final   • Ethanol % 09/06/2021 <0.010  % Final   • HCG Qualitative 09/06/2021 Negative  Negative Final   • COVID19 09/06/2021 Not Detected  Not Detected - Ref. Range Final   • Influenza A PCR 09/06/2021 Not Detected  Not Detected Final   • Influenza B PCR 09/06/2021 Not Detected  Not Detected Final   • WBC 09/06/2021 10.85* 3.40 - 10.80 10*3/mm3 Final   • RBC 09/06/2021 5.36* 3.77 - 5.28 10*6/mm3 Final   • Hemoglobin 09/06/2021 14.2  12.0 - 15.9 g/dL Final   • Hematocrit 09/06/2021 44.9  34.0 - 46.6 % Final   • MCV 09/06/2021 83.8  79.0 - 97.0 fL Final   • MCH 09/06/2021 26.5* 26.6 - 33.0 pg Final   • MCHC 09/06/2021 31.6  31.5 - 35.7 g/dL Final   • RDW 09/06/2021 14.2  12.3 - 15.4 % Final   • RDW-SD 09/06/2021 43.3  37.0 - 54.0 fl Final   • MPV 09/06/2021 9.7  6.0 - 12.0 fL Final   • Platelets 09/06/2021 351  140 - 450 10*3/mm3 Final   • Neutrophil % 09/06/2021 76.2* 42.7 - 76.0 % Final   • Lymphocyte % 09/06/2021 17.1* 19.6 - 45.3 % Final   • Monocyte % 09/06/2021 4.6* 5.0 - 12.0 % Final   • Eosinophil % 09/06/2021 1.1  0.3 - 6.2 % Final   • Basophil % 09/06/2021 0.7  0.0 - 1.5 % Final   • Immature Grans % 09/06/2021 0.3  0.0 - 0.5 % Final   • Neutrophils, Absolute 09/06/2021 8.27* 1.70 - 7.00 10*3/mm3 Final   • Lymphocytes, Absolute 09/06/2021 1.85  0.70 - 3.10 10*3/mm3 Final   • Monocytes, Absolute 09/06/2021 0.50  0.10 - 0.90 10*3/mm3 Final   • Eosinophils, Absolute 09/06/2021 0.12  0.00 - 0.40 10*3/mm3 Final   • Basophils, Absolute 09/06/2021 0.08  0.00 - 0.20 10*3/mm3 Final    • Immature Grans, Absolute 09/06/2021 0.03  0.00 - 0.05 10*3/mm3 Final   • nRBC 09/06/2021 0.0  0.0 - 0.2 /100 WBC Final   • RBC, UA 09/06/2021 6-12* None Seen, 0-2 /HPF Final   • WBC, UA 09/06/2021 3-5* None Seen, 0-2 /HPF Final   • Bacteria, UA 09/06/2021 1+* None Seen /HPF Final   • Squamous Epithelial Cells, UA 09/06/2021 7-12* None Seen, 0-2 /HPF Final   • Hyaline Casts, UA 09/06/2021 3-6  None Seen /LPF Final   • Methodology 09/06/2021 Automated Microscopy   Final   Admission on 09/02/2021, Discharged on 09/02/2021   Component Date Value Ref Range Status   • Glucose 09/02/2021 107* 65 - 99 mg/dL Final   • BUN 09/02/2021 7  6 - 20 mg/dL Final   • Creatinine 09/02/2021 0.52* 0.57 - 1.00 mg/dL Final   • Sodium 09/02/2021 137  136 - 145 mmol/L Final   • Potassium 09/02/2021 3.4* 3.5 - 5.2 mmol/L Final   • Chloride 09/02/2021 103  98 - 107 mmol/L Final   • CO2 09/02/2021 22.1  22.0 - 29.0 mmol/L Final   • Calcium 09/02/2021 9.4  8.6 - 10.5 mg/dL Final   • Total Protein 09/02/2021 7.7  6.0 - 8.5 g/dL Final   • Albumin 09/02/2021 4.18  3.50 - 5.20 g/dL Final   • ALT (SGPT) 09/02/2021 10  1 - 33 U/L Final   • AST (SGOT) 09/02/2021 16  1 - 32 U/L Final   • Alkaline Phosphatase 09/02/2021 86  39 - 117 U/L Final   • Total Bilirubin 09/02/2021 0.6  0.0 - 1.2 mg/dL Final   • eGFR Non African Amer 09/02/2021 138  >60 mL/min/1.73 Final   • Globulin 09/02/2021 3.5  gm/dL Final   • A/G Ratio 09/02/2021 1.2  g/dL Final   • BUN/Creatinine Ratio 09/02/2021 13.5  7.0 - 25.0 Final   • Anion Gap 09/02/2021 11.9  5.0 - 15.0 mmol/L Final   • Lipase 09/02/2021 17  13 - 60 U/L Final   • HCG, Urine QL 09/02/2021 Negative  Negative Final   • Color, UA 09/02/2021 Yellow  Yellow, Straw Final   • Appearance, UA 09/02/2021 Clear  Clear Final   • pH, UA 09/02/2021 7.0  5.0 - 8.0 Final   • Specific Gravity, UA 09/02/2021 1.009  1.005 - 1.030 Final   • Glucose, UA 09/02/2021 Negative  Negative Final   • Ketones, UA 09/02/2021 Negative   Negative Final   • Bilirubin, UA 09/02/2021 Negative  Negative Final   • Blood, UA 09/02/2021 Trace* Negative Final   • Protein, UA 09/02/2021 Negative  Negative Final   • Leuk Esterase, UA 09/02/2021 Negative  Negative Final   • Nitrite, UA 09/02/2021 Negative  Negative Final   • Urobilinogen, UA 09/02/2021 1.0 E.U./dL  0.2 - 1.0 E.U./dL Final   • WBC 09/02/2021 8.31  3.40 - 10.80 10*3/mm3 Final   • RBC 09/02/2021 5.21  3.77 - 5.28 10*6/mm3 Final   • Hemoglobin 09/02/2021 13.9  12.0 - 15.9 g/dL Final   • Hematocrit 09/02/2021 43.1  34.0 - 46.6 % Final   • MCV 09/02/2021 82.7  79.0 - 97.0 fL Final   • MCH 09/02/2021 26.7  26.6 - 33.0 pg Final   • MCHC 09/02/2021 32.3  31.5 - 35.7 g/dL Final   • RDW 09/02/2021 13.8  12.3 - 15.4 % Final   • RDW-SD 09/02/2021 41.5  37.0 - 54.0 fl Final   • MPV 09/02/2021 10.0  6.0 - 12.0 fL Final   • Platelets 09/02/2021 338  140 - 450 10*3/mm3 Final   • Neutrophil % 09/02/2021 61.4  42.7 - 76.0 % Final   • Lymphocyte % 09/02/2021 27.4  19.6 - 45.3 % Final   • Monocyte % 09/02/2021 7.9  5.0 - 12.0 % Final   • Eosinophil % 09/02/2021 2.6  0.3 - 6.2 % Final   • Basophil % 09/02/2021 0.6  0.0 - 1.5 % Final   • Immature Grans % 09/02/2021 0.1  0.0 - 0.5 % Final   • Neutrophils, Absolute 09/02/2021 5.09  1.70 - 7.00 10*3/mm3 Final   • Lymphocytes, Absolute 09/02/2021 2.28  0.70 - 3.10 10*3/mm3 Final   • Monocytes, Absolute 09/02/2021 0.66  0.10 - 0.90 10*3/mm3 Final   • Eosinophils, Absolute 09/02/2021 0.22  0.00 - 0.40 10*3/mm3 Final   • Basophils, Absolute 09/02/2021 0.05  0.00 - 0.20 10*3/mm3 Final   • Immature Grans, Absolute 09/02/2021 0.01  0.00 - 0.05 10*3/mm3 Final   • nRBC 09/02/2021 0.0  0.0 - 0.2 /100 WBC Final   • Extra Tube 09/02/2021 Hold for add-ons.   Final    Auto resulted.   • Extra Tube 09/02/2021 hold for add-on   Final    Auto resulted   • Extra Tube 09/02/2021 Hold for add-ons.   Final    Auto resulted.   • Extra Tube 09/02/2021 hold for add-on   Final    Auto resulted    • RBC, UA 09/02/2021 0-2  None Seen, 0-2 /HPF Final   • WBC, UA 09/02/2021 None Seen  None Seen, 0-2 /HPF Final   • Bacteria, UA 09/02/2021 Trace* None Seen /HPF Final   • Squamous Epithelial Cells, UA 09/02/2021 None Seen  None Seen, 0-2 /HPF Final   • Hyaline Casts, UA 09/02/2021 None Seen  None Seen /LPF Final   • Methodology 09/02/2021 Manual Light Microscopy   Final        Condition on Discharge:  improved    Vital Signs  Temp:  [97.6 °F (36.4 °C)-98.1 °F (36.7 °C)] 97.6 °F (36.4 °C)  Heart Rate:  [72-96] 84  Resp:  [16-18] 18  BP: (125-136)/(76-80) 136/79      Discharge Disposition:  Home or Self Care    Discharge Medications:     Discharge Medications      Continue These Medications      Instructions Start Date   dicyclomine 20 MG tablet  Commonly known as: BENTYL   20 mg, Oral, Every 6 Hours PRN      ondansetron ODT 4 MG disintegrating tablet  Commonly known as: ZOFRAN-ODT   4 mg, Translingual, Every 6 Hours PRN             Discharge Diet: Regular     Activity at Discharge: As tolerated     Follow-up Appointments        [facility]          Time spent in discharge: < 30 min    Clinician:   Andre Reyes MD  09/08/21  14:09 EDT

## 2021-11-28 ENCOUNTER — HOSPITAL ENCOUNTER (EMERGENCY)
Facility: HOSPITAL | Age: 31
Discharge: LEFT BEFORE DISCHARGE | End: 2021-11-28

## 2021-11-28 ENCOUNTER — HOSPITAL ENCOUNTER (EMERGENCY)
Facility: HOSPITAL | Age: 31
Discharge: LEFT BEFORE DISCHARGE | End: 2021-11-28
Attending: STUDENT IN AN ORGANIZED HEALTH CARE EDUCATION/TRAINING PROGRAM | Admitting: STUDENT IN AN ORGANIZED HEALTH CARE EDUCATION/TRAINING PROGRAM

## 2021-11-28 VITALS
TEMPERATURE: 98.7 F | BODY MASS INDEX: 19.99 KG/M2 | HEART RATE: 137 BPM | OXYGEN SATURATION: 98 % | HEIGHT: 65 IN | DIASTOLIC BLOOD PRESSURE: 107 MMHG | SYSTOLIC BLOOD PRESSURE: 140 MMHG | RESPIRATION RATE: 20 BRPM | WEIGHT: 120 LBS

## 2021-11-28 DIAGNOSIS — R10.84 GENERALIZED ABDOMINAL PAIN: ICD-10-CM

## 2021-11-28 DIAGNOSIS — R44.3 HALLUCINATIONS: Primary | ICD-10-CM

## 2021-11-28 PROCEDURE — 99283 EMERGENCY DEPT VISIT LOW MDM: CPT

## 2021-11-28 PROCEDURE — 99211 OFF/OP EST MAY X REQ PHY/QHP: CPT

## 2021-11-28 RX ORDER — SODIUM CHLORIDE 0.9 % (FLUSH) 0.9 %
10 SYRINGE (ML) INJECTION AS NEEDED
Status: DISCONTINUED | OUTPATIENT
Start: 2021-11-28 | End: 2021-11-28 | Stop reason: HOSPADM